# Patient Record
Sex: FEMALE | Race: WHITE | Employment: OTHER | URBAN - METROPOLITAN AREA
[De-identification: names, ages, dates, MRNs, and addresses within clinical notes are randomized per-mention and may not be internally consistent; named-entity substitution may affect disease eponyms.]

---

## 2020-07-24 ENCOUNTER — OFFICE VISIT - HEALTHEAST (OUTPATIENT)
Dept: PEDIATRICS | Facility: CLINIC | Age: 75
End: 2020-07-24

## 2020-07-24 DIAGNOSIS — M81.0 AGE RELATED OSTEOPOROSIS, UNSPECIFIED PATHOLOGICAL FRACTURE PRESENCE: ICD-10-CM

## 2020-07-24 DIAGNOSIS — R53.82 CHRONIC FATIGUE: ICD-10-CM

## 2020-07-24 DIAGNOSIS — L29.9 PRURITIC DISORDER: ICD-10-CM

## 2020-07-24 DIAGNOSIS — N18.2 CHRONIC KIDNEY DISEASE, STAGE 2 (MILD): ICD-10-CM

## 2020-07-24 DIAGNOSIS — Z86.19 HISTORY OF LYME DISEASE: ICD-10-CM

## 2020-07-24 DIAGNOSIS — M25.50 MULTIPLE JOINT PAIN: ICD-10-CM

## 2020-07-24 DIAGNOSIS — M89.9 DISORDER OF BONE, UNSPECIFIED: ICD-10-CM

## 2020-07-24 DIAGNOSIS — R41.3 MEMORY LOSS: ICD-10-CM

## 2020-07-24 LAB
ALBUMIN SERPL-MCNC: 4.3 G/DL (ref 3.5–5)
ALP SERPL-CCNC: 80 U/L (ref 45–120)
ALT SERPL W P-5'-P-CCNC: <9 U/L (ref 0–45)
ANION GAP SERPL CALCULATED.3IONS-SCNC: 11 MMOL/L (ref 5–18)
AST SERPL W P-5'-P-CCNC: 23 U/L (ref 0–40)
BASOPHILS # BLD AUTO: 0 THOU/UL (ref 0–0.2)
BASOPHILS NFR BLD AUTO: 1 % (ref 0–2)
BILIRUB SERPL-MCNC: 0.8 MG/DL (ref 0–1)
BUN SERPL-MCNC: 16 MG/DL (ref 8–28)
C REACTIVE PROTEIN LHE: <0.1 MG/DL (ref 0–0.8)
CALCIUM SERPL-MCNC: 9.9 MG/DL (ref 8.5–10.5)
CHLORIDE BLD-SCNC: 102 MMOL/L (ref 98–107)
CO2 SERPL-SCNC: 27 MMOL/L (ref 22–31)
CREAT SERPL-MCNC: 0.82 MG/DL (ref 0.6–1.1)
EOSINOPHIL # BLD AUTO: 0.1 THOU/UL (ref 0–0.4)
EOSINOPHIL NFR BLD AUTO: 1 % (ref 0–6)
ERYTHROCYTE [DISTWIDTH] IN BLOOD BY AUTOMATED COUNT: 10.8 % (ref 11–14.5)
ERYTHROCYTE [SEDIMENTATION RATE] IN BLOOD BY WESTERGREN METHOD: 6 MM/HR (ref 0–20)
GFR SERPL CREATININE-BSD FRML MDRD: >60 ML/MIN/1.73M2
GLUCOSE BLD-MCNC: 83 MG/DL (ref 70–125)
HCT VFR BLD AUTO: 42 % (ref 35–47)
HGB BLD-MCNC: 14.4 G/DL (ref 12–16)
LYMPHOCYTES # BLD AUTO: 1.4 THOU/UL (ref 0.8–4.4)
LYMPHOCYTES NFR BLD AUTO: 31 % (ref 20–40)
MCH RBC QN AUTO: 31.7 PG (ref 27–34)
MCHC RBC AUTO-ENTMCNC: 34.2 G/DL (ref 32–36)
MCV RBC AUTO: 93 FL (ref 80–100)
MONOCYTES # BLD AUTO: 0.4 THOU/UL (ref 0–0.9)
MONOCYTES NFR BLD AUTO: 9 % (ref 2–10)
NEUTROPHILS # BLD AUTO: 2.7 THOU/UL (ref 2–7.7)
NEUTROPHILS NFR BLD AUTO: 59 % (ref 50–70)
PLATELET # BLD AUTO: 168 THOU/UL (ref 140–440)
PMV BLD AUTO: 9.9 FL (ref 7–10)
POTASSIUM BLD-SCNC: 4.6 MMOL/L (ref 3.5–5)
PROT SERPL-MCNC: 6.6 G/DL (ref 6–8)
RBC # BLD AUTO: 4.52 MILL/UL (ref 3.8–5.4)
RHEUMATOID FACT SERPL-ACNC: <15 IU/ML (ref 0–30)
SODIUM SERPL-SCNC: 140 MMOL/L (ref 136–145)
TSH SERPL DL<=0.005 MIU/L-ACNC: 0.77 UIU/ML (ref 0.3–5)
WBC: 4.5 THOU/UL (ref 4–11)

## 2020-07-24 ASSESSMENT — MIFFLIN-ST. JEOR: SCORE: 989.6

## 2020-07-27 ENCOUNTER — COMMUNICATION - HEALTHEAST (OUTPATIENT)
Dept: INTERNAL MEDICINE | Facility: CLINIC | Age: 75
End: 2020-07-27

## 2020-07-27 LAB — 25(OH)D3 SERPL-MCNC: 48.9 NG/ML (ref 30–80)

## 2020-07-28 ENCOUNTER — COMMUNICATION - HEALTHEAST (OUTPATIENT)
Dept: PEDIATRICS | Facility: CLINIC | Age: 75
End: 2020-07-28

## 2020-07-28 LAB — CCP AB SER IA-ACNC: 2 U/ML

## 2020-07-29 ENCOUNTER — HOSPITAL ENCOUNTER (OUTPATIENT)
Dept: RADIOLOGY | Facility: HOSPITAL | Age: 75
Discharge: HOME | End: 2020-07-29
Attending: PEDIATRICS

## 2020-07-30 ENCOUNTER — COMMUNICATION - HEALTHEAST (OUTPATIENT)
Dept: INTERNAL MEDICINE | Facility: CLINIC | Age: 75
End: 2020-07-30

## 2020-07-30 DIAGNOSIS — R91.1 LUNG NODULE: ICD-10-CM

## 2020-07-30 DIAGNOSIS — R91.8 PULMONARY NODULES: ICD-10-CM

## 2020-08-03 ENCOUNTER — COMMUNICATION - HEALTHEAST (OUTPATIENT)
Dept: INTERNAL MEDICINE | Facility: CLINIC | Age: 75
End: 2020-08-03

## 2020-08-04 ENCOUNTER — AMBULATORY - HEALTHEAST (OUTPATIENT)
Dept: SCHEDULING | Facility: CLINIC | Age: 75
End: 2020-08-04

## 2020-08-04 DIAGNOSIS — R91.8 PULMONARY NODULES: ICD-10-CM

## 2020-08-06 ENCOUNTER — RECORDS - HEALTHEAST (OUTPATIENT)
Dept: ADMINISTRATIVE | Facility: OTHER | Age: 75
End: 2020-08-06

## 2020-08-10 ENCOUNTER — COMMUNICATION - HEALTHEAST (OUTPATIENT)
Dept: INTERNAL MEDICINE | Facility: CLINIC | Age: 75
End: 2020-08-10

## 2020-08-25 ENCOUNTER — OFFICE VISIT - HEALTHEAST (OUTPATIENT)
Dept: INTERNAL MEDICINE | Facility: CLINIC | Age: 75
End: 2020-08-25

## 2020-08-25 DIAGNOSIS — L29.9 PRURITIC DISORDER: ICD-10-CM

## 2020-08-25 DIAGNOSIS — J32.9 CHRONIC SINUSITIS, UNSPECIFIED LOCATION: ICD-10-CM

## 2020-08-25 DIAGNOSIS — R35.0 URINARY FREQUENCY: ICD-10-CM

## 2020-08-25 DIAGNOSIS — R19.7 DIARRHEA, UNSPECIFIED TYPE: ICD-10-CM

## 2020-08-27 ENCOUNTER — HOSPITAL ENCOUNTER (OUTPATIENT)
Dept: ULTRASOUND IMAGING | Facility: HOSPITAL | Age: 75
Discharge: HOME | End: 2020-08-27
Attending: PEDIATRICS

## 2020-08-27 ENCOUNTER — RECORDS - HEALTHEAST (OUTPATIENT)
Dept: ADMINISTRATIVE | Facility: OTHER | Age: 75
End: 2020-08-27

## 2020-08-27 DIAGNOSIS — R35.0 URINARY FREQUENCY: ICD-10-CM

## 2020-08-28 ENCOUNTER — COMMUNICATION - HEALTHEAST (OUTPATIENT)
Dept: PEDIATRICS | Facility: CLINIC | Age: 75
End: 2020-08-28

## 2020-09-08 ENCOUNTER — TRANSFERRED RECORDS (OUTPATIENT)
Dept: HEALTH INFORMATION MANAGEMENT | Facility: CLINIC | Age: 75
End: 2020-09-08

## 2020-09-30 ENCOUNTER — OFFICE VISIT - HEALTHEAST (OUTPATIENT)
Dept: OTOLARYNGOLOGY | Facility: CLINIC | Age: 75
End: 2020-09-30

## 2020-09-30 DIAGNOSIS — R09.89 THROAT TIGHTNESS: ICD-10-CM

## 2020-11-19 ENCOUNTER — RECORDS - HEALTHEAST (OUTPATIENT)
Dept: ADMINISTRATIVE | Facility: OTHER | Age: 75
End: 2020-11-19

## 2020-12-01 ENCOUNTER — OFFICE VISIT - HEALTHEAST (OUTPATIENT)
Dept: INTERNAL MEDICINE | Facility: CLINIC | Age: 75
End: 2020-12-01

## 2020-12-01 DIAGNOSIS — R19.7 DIARRHEA, UNSPECIFIED TYPE: ICD-10-CM

## 2020-12-01 DIAGNOSIS — M25.50 POLYARTHRALGIA: ICD-10-CM

## 2020-12-01 DIAGNOSIS — M81.0 AGE-RELATED OSTEOPOROSIS WITHOUT CURRENT PATHOLOGICAL FRACTURE: ICD-10-CM

## 2020-12-01 DIAGNOSIS — L29.9 PRURITIC DISORDER: ICD-10-CM

## 2021-03-08 ENCOUNTER — AMBULATORY - HEALTHEAST (OUTPATIENT)
Dept: NURSING | Facility: CLINIC | Age: 76
End: 2021-03-08

## 2021-03-29 ENCOUNTER — AMBULATORY - HEALTHEAST (OUTPATIENT)
Dept: NURSING | Facility: CLINIC | Age: 76
End: 2021-03-29

## 2021-04-16 ENCOUNTER — COMMUNICATION - HEALTHEAST (OUTPATIENT)
Dept: ADMINISTRATIVE | Facility: CLINIC | Age: 76
End: 2021-04-16

## 2021-04-16 DIAGNOSIS — L29.9 PRURITIC DISORDER: ICD-10-CM

## 2021-04-16 DIAGNOSIS — R19.7 DIARRHEA, UNSPECIFIED TYPE: ICD-10-CM

## 2021-04-29 ENCOUNTER — COMMUNICATION - HEALTHEAST (OUTPATIENT)
Dept: INTERNAL MEDICINE | Facility: CLINIC | Age: 76
End: 2021-04-29

## 2021-04-29 DIAGNOSIS — R19.7 DIARRHEA, UNSPECIFIED TYPE: ICD-10-CM

## 2021-04-29 DIAGNOSIS — L29.9 PRURITIC DISORDER: ICD-10-CM

## 2021-04-29 DIAGNOSIS — K64.8 HEMORRHOIDS, INTERNAL: ICD-10-CM

## 2021-05-09 ENCOUNTER — HEALTH MAINTENANCE LETTER (OUTPATIENT)
Age: 76
End: 2021-05-09

## 2021-05-19 ENCOUNTER — OFFICE VISIT - HEALTHEAST (OUTPATIENT)
Dept: FAMILY MEDICINE | Facility: CLINIC | Age: 76
End: 2021-05-19

## 2021-05-19 DIAGNOSIS — Z83.49 FAMILY HISTORY OF HEMOCHROMATOSIS: ICD-10-CM

## 2021-05-20 ENCOUNTER — AMBULATORY - HEALTHEAST (OUTPATIENT)
Dept: LAB | Facility: CLINIC | Age: 76
End: 2021-05-20

## 2021-05-20 DIAGNOSIS — Z83.49 FAMILY HISTORY OF HEMOCHROMATOSIS: ICD-10-CM

## 2021-05-20 LAB
FERRITIN SERPL-MCNC: 147 NG/ML (ref 10–130)
IRON SATN MFR SERPL: 42 % (ref 20–50)
IRON SERPL-MCNC: 109 UG/DL (ref 42–175)
TIBC SERPL-MCNC: 257 UG/DL (ref 313–563)
TRANSFERRIN SERPL-MCNC: 206 MG/DL (ref 212–360)
TRANSFERRIN SERPL-MCNC: 206 MG/DL (ref 212–360)

## 2021-05-22 ENCOUNTER — AMBULATORY - HEALTHEAST (OUTPATIENT)
Dept: FAMILY MEDICINE | Facility: CLINIC | Age: 76
End: 2021-05-22

## 2021-05-22 DIAGNOSIS — R79.89 HIGH SERUM TRANSFERRIN SATURATION: ICD-10-CM

## 2021-05-22 DIAGNOSIS — E83.10 DISORDER OF IRON METABOLISM, UNSPECIFIED: ICD-10-CM

## 2021-05-22 DIAGNOSIS — R79.89 ELEVATED FERRITIN LEVEL: ICD-10-CM

## 2021-05-28 ENCOUNTER — AMBULATORY - HEALTHEAST (OUTPATIENT)
Dept: LAB | Facility: CLINIC | Age: 76
End: 2021-05-28

## 2021-05-28 DIAGNOSIS — E83.10 DISORDER OF IRON METABOLISM, UNSPECIFIED: ICD-10-CM

## 2021-05-28 DIAGNOSIS — R79.89 HIGH SERUM TRANSFERRIN SATURATION: ICD-10-CM

## 2021-05-28 DIAGNOSIS — R79.89 ELEVATED FERRITIN LEVEL: ICD-10-CM

## 2021-05-28 LAB
ALBUMIN SERPL-MCNC: 3.9 G/DL (ref 3.5–5)
ALP SERPL-CCNC: 87 U/L (ref 45–120)
ALT SERPL W P-5'-P-CCNC: <9 U/L (ref 0–45)
AST SERPL W P-5'-P-CCNC: 21 U/L (ref 0–40)
BILIRUB DIRECT SERPL-MCNC: 0.2 MG/DL
BILIRUB SERPL-MCNC: 0.6 MG/DL (ref 0–1)
PROT SERPL-MCNC: 6.2 G/DL (ref 6–8)

## 2021-06-03 ENCOUNTER — OFFICE VISIT - HEALTHEAST (OUTPATIENT)
Dept: FAMILY MEDICINE | Facility: CLINIC | Age: 76
End: 2021-06-03

## 2021-06-03 DIAGNOSIS — Z83.49 FAMILY HISTORY OF HEMOCHROMATOSIS: ICD-10-CM

## 2021-06-03 DIAGNOSIS — K59.9 BOWEL DYSFUNCTION: ICD-10-CM

## 2021-06-03 LAB — MOLECULAR DX INHERIT DISORDER - HISTORICAL: NORMAL

## 2021-06-04 VITALS
HEIGHT: 63 IN | OXYGEN SATURATION: 96 % | HEART RATE: 69 BPM | SYSTOLIC BLOOD PRESSURE: 142 MMHG | BODY MASS INDEX: 21.23 KG/M2 | WEIGHT: 119.8 LBS | DIASTOLIC BLOOD PRESSURE: 76 MMHG

## 2021-06-07 ENCOUNTER — RECORDS - HEALTHEAST (OUTPATIENT)
Dept: ADMINISTRATIVE | Facility: OTHER | Age: 76
End: 2021-06-07

## 2021-06-09 NOTE — PROGRESS NOTES
"[facility]  Internal Medicine - Office Visit    Patient: Carina Hernandez   MRN: [id number]   Date of Service: 07/24/20   Patient Care Team:  Provider, No Primary Care as PCP - General    ASSESSMENT/PLAN     Carina Hernandez is a 75 y/o here to establish care:    Diagnoses and all orders for this visit:    Chronic fatigue  -     HM1(CBC and Differential)  -     Comprehensive Metabolic Panel  -     Thyroid Cascade  -     HM1 (CBC with Diff)  -     Vitamin D, Total (25-Hydroxy)    Pruritic disorder  No primary skin lesion. Will check labs and CXR given associated paraneoplastic syndrome with lymphoma.  -     HM1(CBC and Differential)  -     Comprehensive Metabolic Panel  -     Thyroid Cascade  -     XR Chest 2 Views  -     HM1 (CBC with Diff)    Multiple joint pain  She attributes to hx of Lyme. No effusions or morning stiffness. Will get inflammatory markers and antibodies to rule out inflammatory component.  -     Erythrocyte Sedimentation Rate  -     C-Reactive Protein (CRP)  -     Rheumatoid Factor Quant  -     CCP Antibodies    Disorder of bone, unspecified   She does take calcium and vitamin D.   -     Vitamin D, Total (25-Hydroxy)    Age related osteoporosis, unspecified pathological fracture presence   Declines treatment.    Chronic kidney disease, stage 2 (mild)  Hx of mild CKD, but most recent GFR appears normal. Was apparently seen by [facility] who felt it was age related.    History of Lyme disease    Memory Lapses Or Loss    Return to clinic in 3 months for follow up.    Alonzo Barbosa MD  Internal Medicine and Pediatrics  [facility]  Pager [phone]    SUBJECTIVE     Carina Hernandez is a 75 y/o here to establish care.    She recently moved to the area.    She reports a history of CKD dating to 2016. Saw nephrology at [facility] and was told she had \"distended bladder and kidneys\" though chart review by provider states she had normal renal ultrasound and UA. " "    She has osteoporosis, has declined treatment in the past.     She has been dealing with memory issues since 2010. She attribuets this to Lyme disease which left her with chronic memory loss, fatigue, and arthritis issues. She has pain in multiple joints such as her right thumb, hip, knees, and toes, but no swelling or morning stiffness.     She feels tired, particularly after eating.    She has had a persistent pruritis. Has changed soaps, detergents, eliminated wheat. It has been ongoing since 2010.    She often gets bloated, has been going on for over a year now.    Blood pressure is normally fine.     Review of Systems  Pertinent items are noted in HPI    Family History  Pertinent items are noted in HPI     Social History  Pertinent items are noted in HPI    Immunizations  Reviewed.    Past Medical/Surgical History  Reviewed and updated as appropriate    Medications    Current Outpatient Medications:      ASTAXANTHIN ORAL, Take by mouth., Disp: , Rfl:      MAGNESIUM CARBONATE MISC, Use As Directed., Disp: , Rfl:      UNABLE TO FIND, Med Name: Eye Protection  Basics, Disp: , Rfl:      UNABLE TO FIND, Med Name: Yevgeniy, Disp: , Rfl:     Allergies  Allergies   Allergen Reactions     Lactase      PN: Congestion and nasal drip     Wheat Myalgia     PN: Fatigue     Latex Rash            OBJECTIVE       /76 (Patient Site: Right Arm, Patient Position: Sitting, Cuff Size: Adult Regular)   Pulse 69   Ht 5' 2.5\" (1.588 m)   Wt 119 lb 12.8 oz (54.3 kg)   SpO2 96%   BMI 21.56 kg/m      General Appearance:   Alert, cooperative, no distress, appears stated age   HEENT:  Normocephalic, without obvious abnormality   Lungs:     Clear to auscultation bilaterally, respirations unlabored    Heart:    Regular rate and rhythm, S1 and S2 normal, no murmur, rub    or gallop   Abdomen:     Soft, non-tender, bowel sounds active all four quadrants,     no masses, no organomegaly   Extremities:   Extremities normal, atraumatic, " no cyanosis or edema; no effusions noted   Skin:   Skin color, texture, turgor normal, no rashes or lesions   Neurologic:   CNII-XII grossly intact, normal strength grossly       Labs/imaging/studies:  See above

## 2021-06-10 ENCOUNTER — TELEPHONE (OUTPATIENT)
Dept: CONSULT | Facility: CLINIC | Age: 76
End: 2021-06-10

## 2021-06-10 NOTE — TELEPHONE ENCOUNTER
Received radiologist reading for the kidney/bladder ultrasound 8/17/20.  Placed on provider's desk for review.  Brigitte was not able to locate the scan from 2017 kidney ultrasound.

## 2021-06-10 NOTE — TELEPHONE ENCOUNTER
Referral received from Dr. Ismael Hernandez @ [facility] for patient to be seen in Genetics for Family History of hemochromatosis. Spoke with patient, who states that she is independently having genetic testing done (through [facility]) and she was hoping to have those results available to bring in for her Genetics visit, but not be a part of her permanent records. 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 Informed patient that I will discuss with Genetics team to determine how to proceed with scheduling and will call her back regarding plan. Patient is agreeable to this.

## 2021-06-10 NOTE — PROGRESS NOTES
"Carina Hernandez is a 74 y.o. female who is being evaluated via a billable telephone visit.      The patient has been notified of following:     \"This telephone visit will be conducted via a call between you and your physician/provider. We have found that certain health care needs can be provided without the need for a physical exam.  This service lets us provide the care you need with a short phone conversation.  If a prescription is necessary we can send it directly to your pharmacy.  If lab work is needed we can place an order for that and you can then stop by our lab to have the test done at a later time.    Telephone visits are billed at different rates depending on your insurance coverage. During this emergency period, for some insurers they may be billed the same as an in-person visit.  Please reach out to your insurance provider with any questions.    If during the course of the call the physician/provider feels a telephone visit is not appropriate, you will not be charged for this service.\"    Patient has given verbal consent to a Telephone visit? Yes    What phone number would you like to be contacted at? [phone]    Patient would like to receive their AVS by AVS Preference: Mail a copy.    Additional provider notes:     Back in 2005, she was on 9 months of fosamax. Did a lot of research and spoke to nurses. Stopped taking it. Recommended she get Forteo shots, which were very expensive and she did not pursue. In 2011 was encouraged to take estrogen hormone; tried for a while and short time later sister developed breast cancer on hormonal replacement, so she stopped taking it. Would like to talk about it more in the future.     Throat feels clogged up. Uses a gisel pot with salt water. Doesn't seem to work. Wondering about her sinuses and if they could be inflamed. Years ago she did try something.     She has had issues with hemorrhoids. Underwent hemorrhoid banding x 2 in the past.     She gets anal " "mucous and leakage. Bloating comes and goes. History of constipation in the past and when she gets constipated, gets bloated. Started taking magnesium and it helps with constipation. Last colonoscopy \"3-4 years ago\" and was fine per her report. Lactose triggers mucous in stools.      Swollen ankles. Worse this last winter, but it is chronic.     Bladder not emptying completely. Has urinary frequency, sometimes 20 minutes right after.  She has been told before that she had a distended bladder.    Persistent itching. All over scalp, ears, breast, nipples. Dermatologic growth on scalp, back, and abdomen. Hard little pimples. Has been to a dermatologist in the past and that didn't \"get me anywhere.\"       Assessment/Plan:  1. Chronic sinusitis, unspecified location  She would like to see an ear nose and throat doctor to discuss her ongoing concerns with sinuses.  Will refer to ENT.  - Ambulatory referral to ENT    2. Pruritic disorder  Due to her diarrhea and abdominal symptoms, along with intense pruritus without any significant skin lesion per my last exam, will refer on to gastroenterology to see if she may have something like a carcinoid tumor which could be causing her symptoms.  - Ambulatory referral to Gastroenterology    3. Diarrhea, unspecified type  - Ambulatory referral to Gastroenterology    4. Urinary frequency  She has a history of a distended bladder.  She is having some urinary frequency.  We will repeat an ultrasound her kidneys and bladder to see if she has any evidence of urinary retention which could be causing the urinary losses.  - US Kidney Bilateral; Future    Phone call duration:  20 minutes    Alonzo Barbosa MD  "

## 2021-06-10 NOTE — TELEPHONE ENCOUNTER
----- Message from Alonzo Barbosa MD sent at 7/25/2020 10:36 PM CDT -----  Forgot to have patient fill out ZEE for records from [place] while in clinic. Can you look into doing this now that patient has left clinic? Thanks- Dr. Morgan

## 2021-06-10 NOTE — TELEPHONE ENCOUNTER
"----- Message from Alonzo Barbosa MD sent at 7/28/2020  2:09 PM CDT -----  Please call the patient with the following message, and offer to send a letter with my message and their results printed if they would like. If unable to reach, please send a letter with the following message along with their lab results from their most recent encounter with me. Thank you- Dr. Morgan    \"Gerardo Lim,    It was nice meeting you in clinic the other day. Your lab results are back.     Your inflammatory markers are all normal. Your rheumatoid arthritis antibodies were all negative. Your blood counts, electrolytes, liver, and kidney tests are also normal. Your vitamin D level is normal.    I will contact you once I get your x-ray results of your chest.    So far, no lab abnormalities to explain your fatigue, joint pains, and itching. Please follow up with me in 1 month to discuss further workup as needed.     Please let me know if you any questions or concerns,  Dr. Morgan  \"        "

## 2021-06-10 NOTE — TELEPHONE ENCOUNTER
Pt was informed of the providers response and was given the scheduling number for imaging.     Thank you   Maureen GRIMALDO CMA

## 2021-06-10 NOTE — TELEPHONE ENCOUNTER
"Maureen,    Can you call [facility] and ask if this bladder/kidney ultrasound done in August 2020 has been read by a radiologist? There was a scan in by the tech and it states \"FINAL\" but I do not see an official report by the radiologist. There is also a scan from a 2017 kidney ultrasound but no report from the one that was just done this August.    Please update me with what they say,  Dr. Morgan  "

## 2021-06-10 NOTE — TELEPHONE ENCOUNTER
Letter with pt's lab results and providers note were mailed to the pts home.    Thank you   Maureen PÉREZ CMA

## 2021-06-10 NOTE — PROGRESS NOTES
To: ROSETTA Barbosa Care Team   07/25/20    Forgot to have patient fill out EZE for records from [place] while in clinic. Can you look into doing this now that patient has left clinic? Thanks- Dr. Morgan

## 2021-06-10 NOTE — TELEPHONE ENCOUNTER
"----- Message from Alonzo Barbosa MD sent at 7/29/2020  2:36 PM CDT -----  Please call the patient with the following message, and offer to send a letter with my message and their results printed if they would like. If unable to reach, please send a letter with the following message along with their lab results from their most recent encounter with me. Thank you- Dr. Morgan    \"Jordan Lim,    Hope you are doing well. Your chest x-ray shows a very small 8 mm nodule in your left middle lung. The lungs otherwise look normal and there are no other lymph nodes throughout your chest (which is what I was looking for due to the pruritis you have had). Nodules are very common and most are not cancer, however the chest x-ray is not great at evaluating nodules. Because a CT is better at visualizing the nodule, it is recommended that we get a CT of your chest to further evaluate this nodule. Please let me know if you are willing to do this and I can order a CT of your chest.     Please let me know if you any questions or concerns,  Dr. Morgan\"        "

## 2021-06-10 NOTE — TELEPHONE ENCOUNTER
Mailing an ZEE to the pt's home to have the pt to complete then return to the clinic.      Thank you   Maureen GRIMALDO CMA

## 2021-06-10 NOTE — TELEPHONE ENCOUNTER
[facility]: [facility]   08/04/20    Can you follow up with patient and give her scheduling # for radiology so she can call to schedule herself?    Thanks,  Dr. Morgan

## 2021-06-10 NOTE — TELEPHONE ENCOUNTER
Test Results  Who is calling?:  Patient  Who ordered the test:  Alonzo Barbosa MD   Type of test: Imaging  Date of test:  7/29/20  Where was the test performed:  [facility]   What are your questions/concerns?:  Patient asked if it's possible that the shadow that was noted on her chest xray could be the biopsy marker she had placed on her left breast at [facility] in [place]. Patient stated she had the marker placed in 07/2018 and has not had it removed yet. Patient stated she was seeing Dr. Molina at [facility] for this. Patient stated Alonzo Barbosa MD should be able to pull this up on her computer because Alonzo Barbosa MD was looking at her [facility] records when the patient was in the office.    Patient stated per her notes, the biopsy marker is at 11:00, 6 cm from the left nipple.   Okay to leave a detailed message?:  Yes

## 2021-06-10 NOTE — TELEPHONE ENCOUNTER
Reviewed US. Kidneys normal. Bladder normal. No post void residual.    Maureen,  Can you let patient know that the ultrasound of kidney and bladder were normal?    Scanned copy left on Maureen Malave's desk. Please send patient a copy of it as well and scan into chart.    Thank,  Dr. sawyer

## 2021-06-10 NOTE — TELEPHONE ENCOUNTER
Please let patient know I called radiologist. The nodule he is seeing doesn't seem to be where her breast biopsy marker was placed.     Let me know if she wishes to get CT to further characterize the nodule they are seeing.    Dr. Morgan

## 2021-06-10 NOTE — TELEPHONE ENCOUNTER
Called [facility] radiology and was advised to call [facility].  Called [facility] and was informed the reports are kept at [facility].  Called [facility] radiology medical records informed Brigitte of provider's recommendations.   The Brigitte is following up on the radiologist readings and will fax the report to [phone].  Radiology medical records staff verbalized understanding and is agreeable with the plan of care.

## 2021-06-11 NOTE — TELEPHONE ENCOUNTER
Left message to call back for: Patient  Information to relay to patient:  LM with Ultra sound results and that a copy will be mailed to the house.     Thank you   Maureen

## 2021-06-11 NOTE — PROGRESS NOTES
"HISTORY OF PRESENT ILLNESS  Asked to see by Dr. Barbosa for evaluation of throat concerns. Patient reports that her primary problems is a fullness in the throat. She frequently is trying to clear. She has difficulty swallowing saliva but no problems swallowing food. She denies acid reflux symptoms. She feels that she is trying to get something up. She uses saline nasal irrigations. She gargles with salt water as well. The problem is intermittent but is apparent everyday. It doesn't appear to be related to the season. Going on for years. Others have encouraged to see someone about her throat. Dairy seems to make it worse. \"The bigger issue fore me is an issue with my tongue. I'm not aware what my tongue is doing.\" She sleeps on her stomach because of hip pain. She wakes up with her tongue is stuck. She also feels \"sinus drainage.\" Breathrights seem to help with these.     REVIEW OF SYSTEMS  Review of Systems: a 10-system review was performed. Pertinent positives are noted in the HPI and on a separate scanned document in the chart.    PMH, PSH, FH and SH has documented in the EHR.      EXAM    CONSTITUTIONAL  General Appearance:  Normal, well developed, well nourished, no obvious distress  Ability to Communicate:  communicates appropriately.    HEAD AND FACE  Appearance and Symmetry:  Normal, no scalp or facial scarring or suspicious lesions.  Paranasal sinuses tenderness:  Normal, Paranasal sinuses non tender    EARS  Clinical speech reception threshold:  Normal, able to hear normal speech.  Auricle:  Normal, Auricles without scars, lesions, masses.  External auditory canal:  Normal, External auditory canal normal.  Tympanic membrane:  Normal, Tympanic membranes normal without swelling or erythema.  Tympanic membrane mobility:  Normal, Normal tympanic membrane mobility.    NOSE (speculum or scope)  Architecture:  Normal, Grossly normal external nasal architecture with no masses or lesions.  Mucosa:  Normal mucosa, " No polyps or masses.  Septum:  Normal, Septum non-obstructing.  Turbinates:  Normal, No turbinate abnormalities    NASOPHARYNX  Post nasal space normal. Mucosa normal as are the openings to the Eustachian tubes.    ORAL CAVITY AND OROPHARYNX  Lips:  Normal.  Dental and gingiva:  Normal, No obvious dental or gingival disease.  Mucosa:  Normal, Moist mucous membranes.  Tongue:  Normal, Tongue mobile with no mucosal abnormalities  Hard and soft palate:  Normal, Hard and soft palate without cleft or mucosal lesions.  Oral pharynx:  Normal, Posterior pharynx without lesions or remarkable asymmetry.  Saliva:  Normal, Clear saliva.  Masses:  Normal, No palpable masses or pathologically enlarged lymph nodes.    LARYNX AND HYPOPHARYNX (mirror or scope)  Voice Quality:  Normal voice quality.  Supra glottis:  Normal, No edema or erythema.  Epiglottis:  Normal, No epiglottic mass or mucosal lesions.  Pyriform sinuses:  Normal, Pyriform sinuses clear.  Vocal cords appearance:  Normal, Vocal cord motion symmetric.  Vocal cord motion:  Normal, Vocal cord motion symmetric  Endolarynx:  Normal, No Endolaryngeal mass or mucosal lesions.    NECK  Masses/lymph nodes:  Normal, No worrisome neck masses or lymph nodes.  Salivary glands:  Normal, Parotid and submandibular glands.  Trachea and larynx position:  Normal, Trachea and larynx midline.  Thyroid:  Normal, No thyroid abnormality.  Tenderness:  Normal, No cervical tenderness.  Suppleness:  Normal, Neck supple    NEUROLOGICAL  Speech pattern:  Normal, Proasaic    RESPIRATORY  Symmetry and Respiratory effort:  Normal, Symmetric chest movement and expansion with no increased intercostal retractions or use of accessory muscles.     IMPRESSION  1. Chronic habitual throat clearing. I discussing normal exam without evidence inflammation or swelling.  2. Throat tightness/fullness. She swallows solid foods without difficulty, yet her problems is swallowing saliva.     RECOMMENDATION  1. For  the throat clearing I advised cough drop or throat lozenges. Sipping water also helps. Anything to avoid throat clearing is preferential.   2. I explained that we could certainly evaluated her swallowing with barium swallow if swallowing symptoms become worse. Follow up as needed.     Max Breen MD

## 2021-06-13 NOTE — PROGRESS NOTES
"Carina Hernandez is a 75 y.o. female who is being evaluated via a billable telephone visit.      The patient has been notified of following:     \"This telephone visit will be conducted via a call between you and your physician/provider. We have found that certain health care needs can be provided without the need for a physical exam.  This service lets us provide the care you need with a short phone conversation.  If a prescription is necessary we can send it directly to your pharmacy.  If lab work is needed we can place an order for that and you can then stop by our lab to have the test done at a later time.    Telephone visits are billed at different rates depending on your insurance coverage. During this emergency period, for some insurers they may be billed the same as an in-person visit.  Please reach out to your insurance provider with any questions.    If during the course of the call the physician/provider feels a telephone visit is not appropriate, you will not be charged for this service.\"    Patient has given verbal consent to a Telephone visit? Yes    What phone number would you like to be contacted at? [phone]    Patient would like to receive their AVS by AVS Preference: Mail a copy.    Additional provider notes:     Carina is here for follow-up today.    Regarding her pruritus and diarrhea, at her last visit in August, I referred her on to gastroenterology to discuss her diarrhea and intense pruritus to see if there was evidence of pseudotumor. She did meet with [place] [facility] via a virtual visit. They recommended doing further colorectal testing, but she did not pursue further testing. She continues to have diarrhea, though it is not as frequent as before. She continues to have intense pruritus.    She is wondering whether or not she could see a dermatologist. In the past she has been seen by dermatology, who recommended steroids which she was not interested in.    She has swelling in " both of her ankles which started about last winter. She does not have any chest pain or shortness of breath. She has been getting a little bit of weight because of the pandemic. She does wear compression stockings. She has noticed increasing varicose veins in her legs.    She is having more joint pain all over her body. The other week she was having pain along her right ribs. It is better now that she stopped wearing her bra. She knows that she has osteoporosis and is wondering if that could be contributing to her pain.      Assessment/Plan:    1. Pruritic disorder  Work-up including labs as well as a CT chest looking for lymphoma has been negative. I have also referred her on to [place] [facility] to evaluate for carcinoid tumor given that she has diarrhea along with this intense pruritus. She did see [place] [facility] who recommended further work-up which she has not yet pursued. I encouraged her to follow-up and do this testing. She has been seen by dermatology in the past. She would like a second opinion by dermatologist which I agree with. I will refer her on to a dermatologist.  - Ambulatory referral to Dermatology    2. Age-related osteoporosis without current pathological fracture  She is not interested in treatment for osteoporosis. I did discuss with her that due to her osteoporosis, she is at increased risk for fractures. I would not expect her to have the significant joint pain just due to osteoporosis alone. We have done work-up for her polyarthralgia in the past including antibodies and inflammatory markers which were normal.    3. Diarrhea, unspecified type  See above    4. Polyarthralgia  See above    Phone call duration: 15 minutes    Alonzo Barbosa MD  Internal Medicine and Pediatrics  [facility]

## 2021-06-16 PROBLEM — M81.0 AGE-RELATED OSTEOPOROSIS WITHOUT CURRENT PATHOLOGICAL FRACTURE: Status: ACTIVE | Noted: 2018-02-22

## 2021-06-16 PROBLEM — L29.9 PRURITIC DISORDER: Status: ACTIVE | Noted: 2020-12-02

## 2021-06-16 PROBLEM — R19.7 DIARRHEA, UNSPECIFIED TYPE: Status: ACTIVE | Noted: 2020-12-02

## 2021-06-16 PROBLEM — M25.50 POLYARTHRALGIA: Status: ACTIVE | Noted: 2020-12-02

## 2021-06-16 PROBLEM — R91.1 LUNG NODULE: Status: ACTIVE | Noted: 2020-07-30

## 2021-06-16 PROBLEM — A69.20 LYME DISEASE: Status: ACTIVE | Noted: 2020-07-25

## 2021-06-16 PROBLEM — N18.2 CHRONIC KIDNEY DISEASE, STAGE 2 (MILD): Status: ACTIVE | Noted: 2018-02-22

## 2021-06-16 PROBLEM — Z86.19 HISTORY OF LYME DISEASE: Status: ACTIVE | Noted: 2018-02-22

## 2021-06-16 PROBLEM — E78.5 HYPERLIPIDEMIA: Status: ACTIVE | Noted: 2018-02-22

## 2021-06-16 PROBLEM — Z98.890 HISTORY OF COLONOSCOPY: Status: ACTIVE | Noted: 2018-02-22

## 2021-06-16 PROBLEM — K64.8 HEMORRHOIDS, INTERNAL: Status: ACTIVE | Noted: 2018-02-22

## 2021-06-16 NOTE — TELEPHONE ENCOUNTER
Spoke with patient and assisted in scheduling [facility] only visit.     Future Appointments   Date Time Provider Department Center   7/2/2021  9:00 AM Saritha Apodaca [facility]

## 2021-06-16 NOTE — TELEPHONE ENCOUNTER
Last visit: Virtual 12/1/2020-Dr Lorri Barbosa  1. Pruritic disorder  Work-up including labs as well as a CT chest looking for lymphoma has been negative. I have also referred her on to [facility] to evaluate for carcinoid tumor given that she has diarrhea along with this intense pruritus. She did see [facility] who recommended further work-up which she has not yet pursued. I encouraged her to follow-up and do this testing. She has been seen by dermatology in the past. She would like a second opinion by dermatologist which I agree with. I will refer her on to a dermatologist.  - Ambulatory referral to Dermatology     2. Age-related osteoporosis without current pathological fracture  She is not interested in treatment for osteoporosis. I did discuss with her that due to her osteoporosis, she is at increased risk for fractures. I would not expect her to have the significant joint pain just due to osteoporosis alone. We have done work-up for her polyarthralgia in the past including antibodies and inflammatory markers which were normal.     3. Diarrhea, unspecified type  See above     4. Polyarthralgia  See above      Patient requesting new referral for [facility]. Was told a new order is needed.

## 2021-06-16 NOTE — TELEPHONE ENCOUNTER
Reason for Call:  Other Order     Detailed comments: Patient calling this afternoon to request new order for [facility].  Order was placed on 8/2020 and facility is stating patient should get new order.  Patient did speak with facility at that time and wanted to wait until after she was fully vaccinated.  This has since been accomplished, so she would like to follow thru with appt at this time.    Phone Number Patient can be reached at: Home number on file [phone] (home)    Best Time: Any time    Can we leave a detailed message on this number?: Yes    Call taken on 4/16/2021 at 2:17 PM by Tristian Olivier

## 2021-06-17 NOTE — PROGRESS NOTES
Carina Hernandez is a 75 y.o. female who is being evaluated via a billable telephone visit.      What phone number would you like to be contacted at? [phone]  How would you like to obtain your AVS? AVS Preference: Gretel.    Assessment & Plan     Carina was seen today for requesting labs.    Diagnoses and all orders for this visit:    Family history of hemochromatosis  -     Transferrin; Future  -     Iron and Transferrin Iron Binding Capacity; Future  -     Ferritin; Future      Patient Instructions   Carina,  Thank you for speaking with me this morning.  I placed lab orders to check your ferritin, iron, transferrin, and iron binding capacity.    Please contact the lab at [phone] to schedule a lab only visit at your convenience.  I would expect to have results back to you within 2 days.  If the lab results indicate possible hereditary hemochromatosis, then I will place an order for the genetic test, which would be able to confirm the condition.  Please let me know if have any questions, otherwise I will be in touch once your results return.    Return for Follow up if you would like to see genetic counselor. .    Ismael Hernandez MD  [facility]   Carina Hernandez is 75 y.o. and presents today for the following health issues   HPI   Patient would like to have lab testing performed to screen for hereditary hemochromatosis.  Patient's son had recently been evaluated and noted to have an elevated ferritin level.  Further evaluation had suggested elevated liver function tests, prompting screening for hereditary hemochromatosis.  Testing was suggestive of this condition and he subsequently underwent an MRI of his liver which was noted to be reassuring.  Subsequently, patient's daughter had been screened for the condition and had also been noted to be positive for genetic mutation. Patient is concerned that she may have transmitted abnormal gene to her children.  She notes  "no history of having ferritin level checked.  LFTs were noted to be normal in 2018.       Review of Systems  Negative except as noted above in HPI.       Objective    Vitals - Patient Reported  Systolic (Patient Reported): 103  Diastolic (Patient Reported): 66  Blood pressure taken with manual cuff (will exclude from quality measure): Yes  Weight (Patient Reported): 120 lb (54.4 kg)  Height (Patient Reported): 5' 3\" (160 cm)  BMI (Based on Pt Reported Ht/Wt): 21.26    Physical Exam  Objective    General: alert/oriented to person/place. Answers questions appropriately.   Affect: pleasant, cooperative.           Phone call duration: 18 minutes    Ismael Hernandez MD   Family medicine physician  [facility]   "

## 2021-06-17 NOTE — TELEPHONE ENCOUNTER
Please apologize to patient that we did not notify her of results sooner.  Her kidney ultrasound of 8/27/20 was entirely normal.   Thank you,  Ismael Hernandez MD for Dr Morgan

## 2021-06-17 NOTE — TELEPHONE ENCOUNTER
Called and relayed message to patient and she stated that she is not satisfied with just being told that the ultrasound was normal. Patient very frustrated that she is being called now and just told normal results. States that she specifically asked the radiologist to compare this US to her one from 2017 and see if there is a difference or change since at the time in 2017 she learned that she had bladder and kidney distention.     Found the report that was scanned in from 2017. Printed both 2017 and 2020 results. Given to covering provider to look over and note if there is changes as patient would like a more in detail call back then just told it is normal.

## 2021-06-17 NOTE — PATIENT INSTRUCTIONS - HE
Carina,  Thank you for speaking with me this morning.  I placed lab orders to check your ferritin, iron, transferrin, and iron binding capacity.    Please contact the lab at [phone] to schedule a lab only visit at your convenience.  I would expect to have results back to you within 2 days.  If the lab results indicate possible hereditary hemochromatosis, then I will place an order for the genetic test, which would be able to confirm the condition.  Please let me know if have any questions, otherwise I will be in touch once your results return.

## 2021-06-17 NOTE — TELEPHONE ENCOUNTER
Called and left  for patient stating that a referral was placed and she can call [phone] to schedule an appt

## 2021-06-17 NOTE — TELEPHONE ENCOUNTER
See below.  Per patient [facility] is referring back [facility] after recent consult.  Please place orders if appropriate and we will process/send to facility.  Facility will call patient to schedule.

## 2021-06-17 NOTE — TELEPHONE ENCOUNTER
Dr. Morgan    Patient is wanting a call back regarding her referral to [facility].  She said that she got referred to [facility] and they referred to her to [facility] and she has never heard from them.  She would like a call back at .

## 2021-06-17 NOTE — TELEPHONE ENCOUNTER
Called and relayed message to patient and she was beyond thankful for Dr. Hernandez to take the time to compare these for her. She insisted I get the thank you to him.

## 2021-06-17 NOTE — TELEPHONE ENCOUNTER
Attempted to call patient. Rang a few times then went silent.    Will try to call again.    If patient calls back, please relay message below

## 2021-06-17 NOTE — TELEPHONE ENCOUNTER
Patient calling this morning in regards to message/missed call from below.  Patient states there was an US kidney/bladder done on 8/27/2020 that she never received any information on.  US was completed on 8/27/2020, no radiology report for the scan was ever generated and patient has received no word in regards to the results or details of this scan. This is her primary concern at this point as she has never heard anything about the US.    [phone]. Okay to leave message.    Patient took the information about [facility] referral.  She will follow up with them on her own.

## 2021-06-17 NOTE — TELEPHONE ENCOUNTER
Patient did not receive any word or results on ultrasound from 8/27/2020. She is concerned and wants to know the results. There is media scans from 8/27/2020 but I am unsure if this is the actual result report.

## 2021-06-17 NOTE — TELEPHONE ENCOUNTER
Please phone patient.  It looks like we had actually phoned the patient on 8/28/20 with results of ultrasound and also mailed a copy to her.      In any case, the ultrasound from 2017 had shown only mild distension of the kidney pelvis, and this had actually improved after voiding.  They had concluded that it was essentially normal.   They did comment that her bladder was mildly distended prior to voiding and that there was a small amount of urine remaining in the bladder after voiding.       The ultrasound from August had shown no distension in either kidney or the bladder.  There was no evidence of any urine remaining in the bladder after voiding.      I would consider the ultrasound result from August to be entirely reassuring. Please let me know if she has any additional questions.     Thanks,  Ismael Hernandez MD  For [facility]

## 2021-06-20 NOTE — LETTER
Letter by Alonzo Barbosa MD at      Author: Alonzo Barbosa MD Service: -- Author Type: --    Filed:  Encounter Date: 8/10/2020 Status: (Other)         Carina Hernandez  [address]  [place] [address]             August 10, 2020         Dear Ms. Hernandez,    Below are the results from your recent visit:    Resulted Orders   CT Chest Without Contrast    Narrative    EXAM DATE:         08/06/2020    EXAM: CT CHEST WITHOUT CONTRAST  LOCATION: [facility]  DATE/TIME: 8/6/2020 1:00 PM    INDICATION: Indeterminate 8 mm nodular opacity left midlung laterally at  07/29/2020 chest radiograph.  COMPARISON: 07/29/2020 chest radiograph.  TECHNIQUE: CT chest without IV contrast. Multiplanar reformats were obtained.  Dose reduction techniques were used.  CONTRAST: None.    FINDINGS:  LUNGS AND PLEURA: Several 3 mm or less nodular opacities in the right upper  lobe, right middle lobe and left upper lobe (MIP images 22-47) appear to be  small foci of endobronchial secretion. Benign punctate calcified granuloma left  lower lobe. Lungs are otherwise clear. No pleural effusion.    MEDIASTINUM/AXILLAE: No lymphadenopathy. Heart size within normal limits. No  pericardial effusion. No thoracic aortic aneurysm.    UPPER ABDOMEN: No significant finding.    MUSCULOSKELETAL: A benign bone island in the posterolateral aspect of the left  8th rib accounts for the nodular opacity at recent chest radiograph. L1  vertebral density measurement of less than 110HU. This finding indicates a high  likelihood of osteoporosis and, if clinically indicated, DEXA might be useful to  confirm this diagnosis and/or to monitor for treatment related changes.    IMPRESSION:  1.  A benign bone island in the posterolateral aspect of the left eighth rib  accounts for the nodular opacity at recent chest radiograph.  2.  Several 3 mm or less nodular opacities in the right upper lobe, right middle  lobe and left upper lobe appear  "to be small foci of endobronchial secretion. As  a conservative measure, recommend follow-up CT chest in one year to confirm  stability or resolution.  3.  High likelihood of osteoporosis.    NOTE: ABNORMAL REPORT    THE DICTATION ABOVE DESCRIBES AN ABNORMALITY FOR WHICH FOLLOW-UP IS NEEDED.    DICOM format image data is available to non-affiliated external healthcare  facilities or entities on a secure, media-free, reciprocally searchable basis  with patient authorization for at least a 12-month period after the study.                 \"Jordan Lim,     Your CT chest scan is back. As a reminder, this was done because you had what appeared to be a small left nodule around the the rib on the chest x-ray done on 7/29/2020. On the CT, this actually is a \"bone island\" which is a benign bone tumor. It is not concerning on imaging and we do not need to follow up on it unless you develop any bony pain along your left 8th rib.     The rest of your CT scan shows that you have several very small 3mm or less nodules in the right upper, right middle, and left upper lobe. On imaging it looks like some very minor inflammation or secretions in your bronchioles, which are very small airways in your lungs. They do not look concerning for cancer based on their size and appearance, however if you are agreeable we should repeat the CT in 1 year to confirm that they have either resolved or have not gotten bigger. If you develop any shortness of breath or chronic cough in the meantime, of course please follow up with me sooner.     Please let me know if you any questions or concerns,   Dr. Morgan\"     Please call with questions or contact us using [employer].    Sincerely,        Electronically signed by Alonzo Barbosa MD       "

## 2021-06-20 NOTE — LETTER
Letter by Alonzo Barbosa MD at      Author: Alonzo Barbosa MD Service: -- Author Type: --    Filed:  Encounter Date: 7/28/2020 Status: (Other)         Carina Hernandez  [address]  [facility] [address]             July 28, 2020         Dear Ms. David,    Below are the results from your recent visit:    Resulted Orders   Comprehensive Metabolic Panel   Result Value Ref Range    Sodium 140 136 - 145 mmol/L    Potassium 4.6 3.5 - 5.0 mmol/L    Chloride 102 98 - 107 mmol/L    CO2 27 22 - 31 mmol/L    Anion Gap, Calculation 11 5 - 18 mmol/L    Glucose 83 70 - 125 mg/dL    BUN 16 8 - 28 mg/dL    Creatinine 0.82 0.60 - 1.10 mg/dL    GFR MDRD Af Amer >60 >60 mL/min/1.73m2    GFR MDRD Non Af Amer >60 >60 mL/min/1.73m2    Bilirubin, Total 0.8 0.0 - 1.0 mg/dL    Calcium 9.9 8.5 - 10.5 mg/dL    Protein, Total 6.6 6.0 - 8.0 g/dL    Albumin 4.3 3.5 - 5.0 g/dL    Alkaline Phosphatase 80 45 - 120 U/L    AST 23 0 - 40 U/L    ALT <9 0 - 45 U/L    Narrative    Fasting Glucose reference range is 70-99 mg/dL per  American Diabetes Association (ADA) guidelines.   Thyroid Cascade   Result Value Ref Range    TSH 0.77 0.30 - 5.00 uIU/mL   Erythrocyte Sedimentation Rate   Result Value Ref Range    Sed Rate 6 0 - 20 mm/hr   C-Reactive Protein (CRP)   Result Value Ref Range    CRP <0.1 0.0 - 0.8 mg/dL   Rheumatoid Factor Quant   Result Value Ref Range    Rheumatoid Factor Quantitative <15.0 0 - 30 IU/mL   CCP Antibodies   Result Value Ref Range    CCP IgG Antibodies 2.0 <=4.9 U/mL   HM1 (CBC with Diff)   Result Value Ref Range    WBC 4.5 4.0 - 11.0 thou/uL    RBC 4.52 3.80 - 5.40 mill/uL    Hemoglobin 14.4 12.0 - 16.0 g/dL    Hematocrit 42.0 35.0 - 47.0 %    MCV 93 80 - 100 fL    MCH 31.7 27.0 - 34.0 pg    MCHC 34.2 32.0 - 36.0 g/dL    RDW 10.8 (L) 11.0 - 14.5 %    Platelets 168 140 - 440 thou/uL    MPV 9.9 7.0 - 10.0 fL    Neutrophils % 59 50 - 70 %    Lymphocytes % 31 20 - 40 %    Monocytes % 9 2 - 10 %    Eosinophils  "% 1 0 - 6 %    Basophils % 1 0 - 2 %    Neutrophils Absolute 2.7 2.0 - 7.7 thou/uL    Lymphocytes Absolute 1.4 0.8 - 4.4 thou/uL    Monocytes Absolute 0.4 0.0 - 0.9 thou/uL    Eosinophils Absolute 0.1 0.0 - 0.4 thou/uL    Basophils Absolute 0.0 0.0 - 0.2 thou/uL   Vitamin D, Total (25-Hydroxy)   Result Value Ref Range    Vitamin D, Total (25-Hydroxy) 48.9 30.0 - 80.0 ng/mL    Narrative    Deficiency <10.0 ng/mL  Insufficiency 10.0-29.9 ng/mL  Sufficiency 30.0-80.0 ng/mL  Toxicity (possible) >100.0 ng/mL     \"Hello Carina,   It was nice meeting you in clinic the other day. Your lab results are back.     Your inflammatory markers are all normal. Your rheumatoid arthritis antibodies were all negative. Your blood counts, electrolytes, liver, and kidney tests are also normal. Your vitamin D level is normal.     I will contact you once I get your x-ray results of your chest.     So far, no lab abnormalities to explain your fatigue, joint pains, and itching. Please follow up with me in 1 month to discuss further workup as needed.     Please let me know if you any questions or concerns,   Dr. Morgan            "

## 2021-06-25 ENCOUNTER — TELEPHONE (OUTPATIENT)
Dept: CONSULT | Facility: CLINIC | Age: 76
End: 2021-06-25

## 2021-06-25 NOTE — TELEPHONE ENCOUNTER
Carina called me to note she has been unable to complete the pre-visit check in as her [employer] [facility] [facility] MyChart does not seem to have merged with [facility]. I confirmed I would see her regardless as scheduled (telephone visit), but will see if our /clinic  can reach out to help with the MyChart access in the meantime. Carina had no other questions at this time.      Jessy Apodaca MS, [facility]  Genetic Counseling  Genetics and Metabolism Division  [facility]   Phone: [phone]  Pager: [phone]  Email: [email or web address]

## 2021-06-25 NOTE — PROGRESS NOTES
Carina Hernandez is a 75 y.o. female who is being evaluated via a billable telephone visit.      What phone number would you like to be contacted at? [phone]  How would you like to obtain your AVS? AVS Preference: Gretel.    Assessment & Plan     Carina was seen today for test results.    Diagnoses and all orders for this visit:    Family history of hemochromatosis  -     Ambulatory referral to Genetics    Bowel dysfunction   -Will await the notes from visit at [facility] in September to help clarify rationale for their referral of the [facility] to [facility].  I will then follow-up with the patient to try to help explain the reasoning.    Return for Follow up by phone next week once results of genetic testing for hereditary hemochromatosis return.    Ismael Hernandez MD  [facility]   Carina Hernandez is 75 y.o. and presents today for the following health issues   HPI     Testing for hereditary hemochromatosis-I had last spoken with the patient at a virtual visit on 5/19/2021.  At that time she had reported that her adult son and adult daughter had recently been diagnosed with hereditary hemochromatosis and had requested that she be screened for this condition, as well.  Initial testing had included ferritin, iron, TIBC and transferrin.  Based on these results, which had shown elevated ferritin level, borderline elevated iron level, mildly elevated transferrin saturation, I had recommended that patient pursue additional testing.  Patient had testing for genetic mutations for hereditary hemochromatosis on 5/28/21.  Typical turnaround time is listed at 7-10 days.  Results are currently pending.  Patient wonders if it would make sense for her to see a genetic counselor to discuss further the family history of hereditary hemochromatosis and potentially the need for any additional genetic screening.    Patient also notes that she had visited with  gastroenterology in September of last year regarding some bowel irregularities that she was experiencing including persistent anal leakage and mucus discharge with associated bloating.  She notes that the gastroenterologist had recommended that she follow-up with [facility].  She is unclear however what the rationale for this recommendation was, given her symptoms.  She has yet to schedule a visit with [facility].  The notes from that particular visit are unfortunately not available for review within the patient's EMR.      Review of Systems  Negative except as noted above in HPI.       Objective       Vitals:  No vitals were obtained today due to virtual visit.    Physical Exam  Objective    General: alert/oriented to person/place. Answers questions appropriately.   Affect: pleasant, cooperative.   Respiratory: no labored breathing noted.  No coughing during visit.    Component      Latest Ref Rng & Units 5/20/2021 5/20/2021           1:30 PM  1:30 PM   Iron      42 - 175 ug/dL  109   Transferrin      212 - 360 mg/dL 206 (L) 206 (L)   Transferrin Saturation, Calculated      20 - 50 %  42   Transferrin IBC, Calculated      313 - 563 ug/dL  257 (L)   Ferritin      10 - 130 ng/mL 147 (H)      Phone call duration: 23 minutes    Ismael Hernandez MD   Family medicine physician  [facility]

## 2021-06-26 NOTE — PATIENT INSTRUCTIONS - HE
Thanks for speaking with me today about your recent test results and the additional questions that you had regarding genetic testing.  The test that you had performed on the 28th of last month should be back for review next week at some point.  I will be in touch once they cross my desk.  I did also place a referral for you to visit with one of our genetic counselors regarding the significance of test results.  One of their schedulers should be contacting you to help schedule an appointment, but feel free to defer this if you wish until a later date.    I did send a request to [facility] for a copy of your consultation from September of this year.  Once I received that and review it, I can send back a message regarding the rationale for them referring you to [facility].

## 2021-06-29 ENCOUNTER — RECORDS - HEALTHEAST (OUTPATIENT)
Dept: INTERNAL MEDICINE | Facility: CLINIC | Age: 76
End: 2021-06-29

## 2021-06-29 DIAGNOSIS — H90.6 MIXED CONDUCTIVE AND SENSORINEURAL HEARING LOSS OF BOTH EARS: ICD-10-CM

## 2021-07-02 ENCOUNTER — VIRTUAL VISIT (OUTPATIENT)
Dept: CONSULT | Facility: CLINIC | Age: 76
End: 2021-07-02
Attending: GENETIC COUNSELOR, MS
Payer: MEDICARE

## 2021-07-02 DIAGNOSIS — Z83.49 FAMILY HISTORY OF HEMOCHROMATOSIS: ICD-10-CM

## 2021-07-02 DIAGNOSIS — Z14.8 CARRIER OF HEMOCHROMATOSIS HFE GENE MUTATION: Primary | ICD-10-CM

## 2021-07-02 DIAGNOSIS — Z15.89 MONOALLELIC MUTATION OF HFE GENE: ICD-10-CM

## 2021-07-02 DIAGNOSIS — Z71.83 ENCOUNTER FOR NONPROCREATIVE GENETIC COUNSELING: ICD-10-CM

## 2021-07-02 PROCEDURE — 96040 HC GENETIC COUNSELING, EACH 30 MINUTES: CPT | Mod: TEL | Performed by: GENETIC COUNSELOR, MS

## 2021-07-02 NOTE — Clinical Note
7/2/2021      RE: Carina Hernandez  [address]  [facility] [address]       Carina is a 75 year old who is being evaluated via a billable telephone visit.      What phone number would you like to be contacted at? [phone]  How would you like to obtain your AVS? Mail a copy       Elizabeth Blount M.A.        Saritha Apodaca GC

## 2021-07-02 NOTE — LETTER
7/2/2021      RE: Carina Hernandez  [address]  [facility] [address]       Carina is a 75 year old who is being evaluated via a billable telephone visit.      What phone number would you like to be contacted at? [phone]  How would you like to obtain your AVS? Mail a copy       Elizabeth Blount M.A.      Presenting information:   Carina Hernandez is a 75 year of female with a family history of hemochromatosis. She was referred for a genetics evaluation by Dr. Ismael Hernandez and was seen today virtually at the [facility]. I met with Carina by telephone to obtain a personal and family history, review her genetic testing results, and discuss the genetics and inheritance of hemochromatosis.     Personal History:  Carina had initial testing including ferritin, iron, TIBC and transferrin ordered due to her family history. Based on these results (elevated ferritin level, normal iron level, low transferrin) Dr. Hernandez had ordered additional genetic testing. See Dr. Hernandez' encounters for more details. 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 The H63D and S65C mutations were not detected.     Additional history includes mild chronic kidney disease diagnosed 3-4 years ago (etiology reportedly unknown), possible osteoporosis, and Lyme disease with symptoms.     Family History:   A pedigree was obtained today and sent to be scanned into the EMR. The family history was significant for the following:    Carina has two daughters and one son. Her son (~52 years) was the first to be diagnosed with hemochromatosis with reported positive genetic testing. He has a history of migraines, joint issues, depression, fatigue, and eye sight changes. This lead to elevated iron levels being found, and subsequently the diagnosis. He has started treatment with blood draws, chelation, and diet changes. He has three sons, who are reportedly being tested for  "hemochromatosis. One has low hemoglobin, for which testing is on-going. If a cause is found, this may give more information about if there is any significance for other family members. One of Carina's daughters (~50 years) was also diagnosed with hemochromatosis 2 months ago and reportedly found to have two copies of the C282Y mutation. She has a history of dizzy spells that affected her eye sight (possible testing was done, full details were unknown today). She also donated a kidney to her father. She has one daughter, who is healthy and reportedly going to be tested for hemochromatosis. Carina's other daughter (~50 years) has not had hemochromatosis testing yet. She has no health concerns. She has three biologic children, who are health other than 2 being born prematurity.    Carina has one brother, who is ~81 years. She is not aware of any health concerns for him. Carina also has one sister, who passed in 2016 (~65 years) from metastatic breast cancer. This was diagnosed when she was around 60 years. Carina notes she was \"on hormones\" that may have been a factor for this diagnosis. One of her children has developmental delays, which reportedly was possible due to some bacteria that \"caused damage\" for him.    Carina's mother passed in 2008. She had a history of abdominal pain, a hernia, HBP, anxiety, osteoporosis, and breast cancer diagnosed in her 80's-90's (Carina notes it may have been related to hormone takes prior for her bone symptoms). Recommend at minimum that the family let their providers know about this family history of cancer so they can get appropriate screening.    Carina's father passed in 1996 due to a stroke after getting a pacemaker. He had a history of an enlarged heart. Recommend that the family let their providers know about this family history so they can get appropriate cardiac screening.    One of Carina's paternal cousins has a handicap son, who has different facial features and " "finger differences. Full details are unknown, but Carina noted it was \"like Down syndrome.\" This sounds suspicious for a possible genetic condition. While it seems unlikely to change anything for Carina's direct management, additional information regarding this individual's diagnosis and any other family members that may have been tested would be beneficial to determine if there is increased chance for other family members personally have or have a child with related symptoms.    Full extended family history was not obtained today due to time, but negative for known individuals with hemochromatosis, known elevated iron, similar history, infertility, and other known genetic conditions.    Carina is of [place] ancestry on her mother's side and [place] ancestry on her paternal side. Consanguinity was denied.     Background:   Genes are long stretches of DNA that are responsible for how our bodies look and how our bodies work. We all have two copies of every gene, one inherited from our mother and one inherited from our father. When there is a change, called a mutation, in a gene it can cause it to not do its job correctly which can cause the signs and symptoms of a genetic condition. Carina studied biology in college and was aware of this information.     Hemochromatosis is a variable adult-onset condition that is caused by an over-absorption of iron. Without proper treatment and management, extra iron can be stored in other parts of the body including the heart, liver, pancreas, joints, and skin, which can lead to damage to these organs and tissues. Early signs of hemochromatosis include abdominal and joint pain, fatigue, lethargy, and weight loss. As the disease progresses, individuals can experience heart and liver disease, diabetes, and arthritis. Periodic therapeutic blood removal, or phlebotomy, is the most common means of iron reduction/treatment. Not every individual who carries mutations in " HFE gene will develop hemochromatosis, and even those who do in a family may have variability in exact symptoms and severity.     The most common gene associated with hemochromatosis is the HFE gene. HFE-associated hereditary hemochromatosis (HFE-HH) is inherited in an autosomal recessive pattern, meaning an individual must have a mutation in both copies of the HFE gene to have hemochromatosis. A carrier is an individual who has one working copy of the HFE gene without a mutation and one non-working copy of the HFE gene that contains a mutation. Carriers are not expected to have symptoms of HFE-HH. However, when two carriers for HFE-HH have children together, or a carrier and an individual with HFE-HH have a child together, there is an increased chance the child will inherit two mutations and a genetic predisposition to HFE-HH. For example, if two individuals are both a carrier, there would be a 25% chance for each of their children to inherited a mutation in both copies of their HFE gene and be at increased risk for hemochromatosis. Somewhere around 1/9 (11%) of individuals with  ancestry are carriers for HFE-HH.     There are different exact mutations in the HFE gene, which can have different associated risks. One of the most common mutations is the C282Y mutation. Individuals who have this mutation on both copies of their gene (called homozygous) are at increased risk for hemochromatosis. However, individuals with this mutation on both copies of the gene will not always develop any symptoms. An estimated ~28% of C282Y homozygous men and lower percentage of C282Y homozygous women will develop disease. Individuals with combinations of HFE variants on both copies of the gene other than C282Y/C282Y may have elevated serum TS or serum ferritin concentrations but have smaller chance to develop complications of iron overload. Carriers do not seem to develop iron overload but may occasionally have abnormal serum  iron studies.     Carina's Testing Results:  We reviewed Carina's past genetic testing results, which found that she has one of the tested HFE mutations (aka one copy of the gene with a mutation). Specifically, she was found to be heterozygous (aka one copy) for the C282Y mutation. She was not found to have either of the other 2 HFE mutations tested for. Therefore, we expect Carina is very likely a carrier only for hemochromatosis.    While C282Y heterozygosity is associated with subtle changes in iron metabolism, it does not appear to contribute significantly to iron overload or hemochromatosis. Therefore, we discussed that it is unlikely that this result would be associated with any health implications for Carina's personally.      Family Implications:  We discussed that it is most likely that Marios children with hemochromatosis both have two copies of the C282Y mutation, one from each of their parents. Therefore, their father would also be at least a carrier for hemochromatosis. Genetic testing or iron/ferritin testing would be recommended for him if not done prior for more information about if he is a carrier only vs if he could have two mutations.    If Carina's children's father is also a carrier only (aka both their parents are carriers only), there is a 25% chance for each pregnancy that a child would inherit the two non-working copies from parents and have increased risk for hemochromatosis. There would also be a 50% chance for a child to be a carrier only, and a 25% chance for a child to be a non-affected non-carrier. No one has control over which copy they pass on to their child since it is a random process. Therefore, testing would be recommended for all of Alessio children as they would have at least a 25% chance to have two copies of the gene/increased risk for hemochromatosis based on the family history.     Other extended family members, including Carina's siblings, may also have an  "increased chance to have inherited this mutation as well. Someone's exact chance to have hemochromatosis vs be a carrier vs neither depends on if both of their parents were a carrier/affected or not. Testing would be available for extended family members based on the family history.     Any individual found to have 2 HFE mutations would be at increased risk for hemochromatosis, and would be recommended to follow up with their primary care provider or a specialist for management. This information would be important for their management and monitoring for the early signs of HFE-HH that could lead to the development of clinical symptoms of the disease if left untreated, and would include iron levels being checked. It is very important to get iron levels down to normal if elevated for these individuals. Carrier testing would be available for their partner for more information on their chance to have a child with increased risk of hemochromatosis (if of reproductive age or if they have children).      Any individual found to have 1 HFE mutation/carrier could have a child with 2 mutations and an increased risk for hemochromatosis IF their partner also carried a mutation in this gene. If a family member is found to be a carrier, their partner could also then have carrier testing for more information on their chance to have a child with increased risk of hemochromatosis (if of reproductive age or if they have children).      We discussed briefly that a family member's PCP may be able to order testing for hemochromatosis. They also could be seen by a genetic counselor to assist with testing. Carina is welcome to share my contact information with family if they have questions about testing.     Additional Discussion:  Carina had several other good questions today. For one, she questioned benefits of direct-to-consumer genetic testing, such as [employer]. We discussed that this is not clinically testing, so is more often \"for " "fun\" rather than for medical management.     Carina also had questions about security of medical records and testing. Clinical genetic testing and providers' notes would be documented in [facility] electronic medical record, but are protected health information (PHI). Family members' names and identifying information would not be documented without permission.     There are some direct-to-consumer genetic tests not done through a provider/hospital system that may look for some hemochromatosis mutations (ex. I believe [employer] looks for one or two hemochromatosis mutations) which could be pursued by an individual directly, though I would still recommend clinical genetic testing through a provider for most accurate information for family members.    We discussed that some symptoms are not thought to have a single underlying genetic cause (ex. osteoporosis), so good genetic testing does not exist for them at this time. Multiple known and unknown genetic and non-genetics factors may contribute to some of these conditions.    Another role of our Genetics team is to have support resources available at each stage for an individual and discuss these as the family wishes. If support resources are helpful (medical literature, family connections, professional counseling resources, etc.) at any time, we are happy to assist. Some good resources for hemachromatosis are: https://www.hemochromatosis.org/ and https://medlineplus.gov/genetics/condition/hereditary-hemochromatosis.        It was a pleasure to meet with Carina virtually today. She had no additional questions at this time. Contact information was shared for any future questions or concerns that arise.     Plan:   1. Carina's genetic testing results and the genetics of hemochromatosis were reviewed today.  2. Hemachromatosis testing would be recommended for each of Carina's children.  3. Testing would also be available for Carina's children's father and her " extended family. Family members are also welcome to contact me with questions.  4. Contact information was provided should any questions arise in the future.      Jessy Apodaca MS, [facility]  Genetic Counselor  Division of Genetics and Metabolism  [facility]   Phone: [phone]  Pager: [phone]        CC: PCP; Dr. Ismael David Time Spent on Phone: 70 min

## 2021-07-02 NOTE — PROGRESS NOTES
Carina is a 75 year old who is being evaluated via a billable telephone visit.      What phone number would you like to be contacted at? [phone]  How would you like to obtain your AVS? Mail a copy       Elizabeth Blount M.A.

## 2021-07-07 NOTE — TELEPHONE ENCOUNTER
"Last visit: 6/3/2021-Dr. Hernandez.    Last visit with PCP: 12/1/2020-Dr Lorri Barbosa    Patient requesting referral for \"hearing test\" see patient message    "

## 2021-07-29 NOTE — PROGRESS NOTES
Presenting information:   Carina Hernandez is a 75 year of female with a family history of hemochromatosis. She was referred for a genetics evaluation by Dr. Ismael Hernandez and was seen today virtually at the [facility]. I met with Carina by telephone to obtain a personal and family history, review her genetic testing results, and discuss the genetics and inheritance of hemochromatosis.     Personal History:  Carina had initial testing including ferritin, iron, TIBC and transferrin ordered due to her family history. Based on these results (elevated ferritin level, normal iron level, low transferrin) Dr. Hernandez had ordered additional genetic testing. See Dr. Hernandez' encounters for more details. 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 The H63D and S65C mutations were not detected.     Additional history includes mild chronic kidney disease diagnosed 3-4 years ago (etiology reportedly unknown), possible osteoporosis, and Lyme disease with symptoms.     Family History:   A pedigree was obtained today and discussed with Carina. Carina requested it not be detailed in my clinic note or scanned into her electronic medical records. Carina was aware other providers would therefore not be able to see my family history and would have to obtain pertinent family history information themselves.     Family history of hemochromatosis (Carina's children) was the reason for referral today. These family members reportedly had positive genetic testing (I do not have a copy to confirm their exact results).     Background:   Genes are long stretches of DNA that are responsible for how our bodies look and how our bodies work. We all have two copies of every gene, one inherited from our mother and one inherited from our father. When there is a change, called a mutation, in a gene it can cause it to not do its job correctly which can cause the  signs and symptoms of a genetic condition. 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     Hemochromatosis is a variable adult-onset condition that is caused by an over-absorption of iron. Without proper treatment and management, extra iron can be stored in other parts of the body including the heart, liver, pancreas, joints, and skin, which can lead to damage to these organs and tissues. Early signs of hemochromatosis include abdominal and joint pain, fatigue, lethargy, and weight loss. As the disease progresses, individuals can experience heart and liver disease, diabetes, and arthritis. Periodic therapeutic blood removal, or phlebotomy, is the most common means of iron reduction/treatment. Not every individual who carries mutations in HFE gene will develop hemochromatosis, and even those who do in a family may have variability in exact symptoms and severity.     The most common gene associated with hemochromatosis is the HFE gene. HFE-associated hereditary hemochromatosis (HFE-HH) is inherited in an autosomal recessive pattern, meaning an individual must have a mutation in both copies of the HFE gene to have hemochromatosis. A carrier is an individual who has one working copy of the HFE gene without a mutation and one non-working copy of the HFE gene that contains a mutation. Carriers are not expected to have symptoms of HFE-HH. However, when two carriers for HFE-HH have children together, or a carrier and an individual with HFE-HH have a child together, there is an increased chance the child will inherit two mutations and a genetic predisposition to HFE-HH. For example, if two individuals are both a carrier, there would be a 25% chance for each of their children to inherited a mutation in both copies of their HFE gene and be at increased risk for hemochromatosis. Somewhere around 1/9 (11%) of individuals with  ancestry are carriers for HFE-HH.     There are different exact  mutations in the HFE gene, which can have different associated risks. One of the most common mutations is the C282Y mutation. Individuals who have this mutation on both copies of their gene (called homozygous) are at increased risk for hemochromatosis. However, individuals with this mutation on both copies of the gene will not always develop any symptoms. An estimated ~28% of C282Y homozygous men and lower percentage of C282Y homozygous women will develop disease. Individuals with combinations of HFE variants on both copies of the gene other than C282Y/C282Y may have elevated serum TS or serum ferritin concentrations but have smaller chance to develop complications of iron overload. Carriers do not seem to develop iron overload but may occasionally have abnormal serum iron studies.     [facility] Testing Results:  We reviewed Carina's past genetic testing results, which found that she has one of the tested HFE mutations (aka one copy of the gene with a mutation). Specifically, she was found to be heterozygous (aka one copy) for the C282Y mutation. She was not found to have either of the other 2 HFE mutations tested for. Therefore, we expect Carina is very likely a carrier only for hemochromatosis.    While C282Y heterozygosity is associated with subtle changes in iron metabolism, it does not appear to contribute significantly to iron overload or hemochromatosis. Therefore, we discussed that it is unlikely that this result would be associated with any health implications for Carina's personally.      Family Implications:  We discussed that it is most likely that Marios children with hemochromatosis both have two copies of the C282Y mutation, one from each of their parents. Therefore, their father would also be at least a carrier for hemochromatosis. Genetic testing or iron/ferritin testing would be recommended for him if not done prior for more information about if he is a carrier only vs if he could have two  mutations.    If Marios children's father is also a carrier only (aka both their parents are carriers only), there is a 25% chance for each pregnancy that a child would inherit the two non-working copies from parents and have increased risk for hemochromatosis. There would also be a 50% chance for a child to be a carrier only, and a 25% chance for a child to be a non-affected non-carrier. No one has control over which copy they pass on to their child since it is a random process. Therefore, testing would be recommended for all of Marios children as they would have at least a 25% chance to have two copies of the gene/increased risk for hemochromatosis based on the family history.     Other extended family members, including Carina's siblings, may also have an increased chance to have inherited this mutation as well. Someone's exact chance to have hemochromatosis vs be a carrier vs neither depends on if both of their parents were a carrier/affected or not. Testing would be available for extended family members based on the family history.     Any individual found to have 2 HFE mutations would be at increased risk for hemochromatosis, and would be recommended to follow up with their primary care provider or a specialist for management. This information would be important for their management and monitoring for the early signs of HFE-HH that could lead to the development of clinical symptoms of the disease if left untreated, and would include iron levels being checked. It is very important to get iron levels down to normal if elevated for these individuals. Carrier testing would be available for their partner for more information on their chance to have a child with increased risk of hemochromatosis (if of reproductive age or if they have children).      Any individual found to have 1 HFE mutation/carrier could have a child with 2 mutations and an increased risk for hemochromatosis IF their partner also carried a  "mutation in this gene. If a family member is found to be a carrier, their partner could also then have carrier testing for more information on their chance to have a child with increased risk of hemochromatosis (if of reproductive age or if they have children).      We discussed briefly that a family member's PCP may be able to order testing for hemochromatosis. They also could be seen by a genetic counselor to assist with testing. Carina is welcome to share my contact information with family if they have questions about testing.     Additional Discussion:  Carina had several other good questions today. For one, she questioned benefits of direct-to-consumer genetic testing, such as [employer]. We discussed that this is not clinically testing, so is more often \"for fun\" rather than for medical management.     Carina also had questions about security of medical records and testing. Clinical genetic testing and providers' notes would be documented in [facility] electronic medical record, but are protected health information (PHI). Family members' names and identifying information would not be documented without permission.     There are some direct-to-consumer genetic tests not done through a provider/hospital system that may look for some hemochromatosis mutations (ex. I believe [employer]And[employer] looks for one or two hemochromatosis mutations) which could be pursued by an individual directly, though I would still recommend clinical genetic testing through a provider for most accurate information for family members.    We discussed that some symptoms are not thought to have a single underlying genetic cause (ex. osteoporosis), so good genetic testing does not exist for them at this time. Multiple known and unknown genetic and non-genetics factors may contribute to some of these conditions.    Another role of our Genetics team is to have support resources available at each stage for an individual and discuss these as the " family wishes. If support resources are helpful (medical literature, family connections, professional counseling resources, etc.) at any time, we are happy to assist. Some good resources for hemachromatosis are: https://www.hemochromatosis.org/ and https://medlineplus.gov/genetics/condition/hereditary-hemochromatosis.        It was a pleasure to meet with Carina virtually today. She had no additional questions at this time. Contact information was shared for any future questions or concerns that arise.     Plan:   1. Carina's genetic testing results and the genetics of hemochromatosis were reviewed today.  2. Hemachromatosis testing would be recommended for each of Carina's children.  3. Testing would also be available for Carina's children's father and her extended family. Family members are also welcome to contact me with questions.  4. Contact information was provided should any questions arise in the future.      Jessy Apodaca MS, [facility]  Genetic Counselor  Division of Genetics and Metabolism  [facility]   Phone: [phone]  Pager: [phone]        CC: PCP; Dr. Ismael Hernandez; Carina  Approx Time Spent on Phone: 70 min

## 2021-09-13 PROCEDURE — 51702 INSERT TEMP BLADDER CATH: CPT

## 2021-09-13 PROCEDURE — 96374 THER/PROPH/DIAG INJ IV PUSH: CPT

## 2021-09-13 PROCEDURE — 96375 TX/PRO/DX INJ NEW DRUG ADDON: CPT

## 2021-09-13 PROCEDURE — 99285 EMERGENCY DEPT VISIT HI MDM: CPT

## 2021-09-13 NOTE — ED TRIAGE NOTES
"Pt reports that she was lifting a heavy box last week and noticed a \"twinge\" in her lower left back. This morning, she was lifting her phone to the left ear and she developed \"excruciating\" lower left back pain. Pt states that when she is completely at rest she has no pain, but with any movement it is severe.   "

## 2021-09-14 ENCOUNTER — APPOINTMENT (OUTPATIENT)
Dept: CT IMAGING | Facility: HOSPITAL | Age: 76
End: 2021-09-14
Attending: EMERGENCY MEDICINE
Payer: MEDICARE

## 2021-09-14 ENCOUNTER — HOSPITAL ENCOUNTER (EMERGENCY)
Facility: HOSPITAL | Age: 76
Discharge: HOME | End: 2021-09-14
Attending: EMERGENCY MEDICINE | Admitting: EMERGENCY MEDICINE
Payer: MEDICARE

## 2021-09-14 VITALS
HEART RATE: 62 BPM | BODY MASS INDEX: 20.7 KG/M2 | RESPIRATION RATE: 21 BRPM | WEIGHT: 115 LBS | OXYGEN SATURATION: 98 % | DIASTOLIC BLOOD PRESSURE: 58 MMHG | SYSTOLIC BLOOD PRESSURE: 124 MMHG | TEMPERATURE: 99.1 F

## 2021-09-14 DIAGNOSIS — K59.00 CONSTIPATION, UNSPECIFIED CONSTIPATION TYPE: ICD-10-CM

## 2021-09-14 DIAGNOSIS — R10.9 FLANK PAIN: ICD-10-CM

## 2021-09-14 DIAGNOSIS — R33.8 ACUTE URINARY RETENTION: ICD-10-CM

## 2021-09-14 LAB
ALBUMIN SERPL-MCNC: 4 G/DL (ref 3.5–5)
ALBUMIN UR-MCNC: NEGATIVE MG/DL
ALP SERPL-CCNC: 77 U/L (ref 45–120)
ALT SERPL W P-5'-P-CCNC: <9 U/L (ref 0–45)
ANION GAP SERPL CALCULATED.3IONS-SCNC: 13 MMOL/L (ref 5–18)
APPEARANCE UR: CLEAR
AST SERPL W P-5'-P-CCNC: 23 U/L (ref 0–40)
ATRIAL RATE - MUSE: 67 BPM
BASOPHILS # BLD AUTO: 0 10E3/UL (ref 0–0.2)
BASOPHILS NFR BLD AUTO: 0 %
BILIRUB SERPL-MCNC: 0.9 MG/DL (ref 0–1)
BILIRUB UR QL STRIP: NEGATIVE
BUN SERPL-MCNC: 11 MG/DL (ref 8–28)
C REACTIVE PROTEIN LHE: <0.1 MG/DL (ref 0–0.8)
CALCIUM SERPL-MCNC: 9.2 MG/DL (ref 8.5–10.5)
CHLORIDE BLD-SCNC: 103 MMOL/L (ref 98–107)
CO2 SERPL-SCNC: 23 MMOL/L (ref 22–31)
COLOR UR AUTO: ABNORMAL
CREAT SERPL-MCNC: 0.78 MG/DL (ref 0.6–1.1)
DIASTOLIC BLOOD PRESSURE - MUSE: 59 MMHG
EOSINOPHIL # BLD AUTO: 0 10E3/UL (ref 0–0.7)
EOSINOPHIL NFR BLD AUTO: 0 %
ERYTHROCYTE [DISTWIDTH] IN BLOOD BY AUTOMATED COUNT: 13.2 % (ref 10–15)
GFR SERPL CREATININE-BSD FRML MDRD: 74 ML/MIN/1.73M2
GLUCOSE BLD-MCNC: 91 MG/DL (ref 70–125)
GLUCOSE UR STRIP-MCNC: NEGATIVE MG/DL
HCT VFR BLD AUTO: 43.5 % (ref 35–47)
HGB BLD-MCNC: 14.8 G/DL (ref 11.7–15.7)
HGB UR QL STRIP: NEGATIVE
IMM GRANULOCYTES # BLD: 0 10E3/UL
IMM GRANULOCYTES NFR BLD: 0 %
INTERPRETATION ECG - MUSE: NORMAL
KETONES UR STRIP-MCNC: 60 MG/DL
LEUKOCYTE ESTERASE UR QL STRIP: NEGATIVE
LIPASE SERPL-CCNC: 43 U/L (ref 0–52)
LYMPHOCYTES # BLD AUTO: 1.3 10E3/UL (ref 0.8–5.3)
LYMPHOCYTES NFR BLD AUTO: 25 %
MCH RBC QN AUTO: 31.8 PG (ref 26.5–33)
MCHC RBC AUTO-ENTMCNC: 34 G/DL (ref 31.5–36.5)
MCV RBC AUTO: 93 FL (ref 78–100)
MONOCYTES # BLD AUTO: 0.4 10E3/UL (ref 0–1.3)
MONOCYTES NFR BLD AUTO: 8 %
MUCOUS THREADS #/AREA URNS LPF: PRESENT /LPF
NEUTROPHILS # BLD AUTO: 3.5 10E3/UL (ref 1.6–8.3)
NEUTROPHILS NFR BLD AUTO: 67 %
NITRATE UR QL: NEGATIVE
NRBC # BLD AUTO: 0 10E3/UL
NRBC BLD AUTO-RTO: 0 /100
P AXIS - MUSE: 54 DEGREES
PH UR STRIP: 6.5 [PH] (ref 5–7)
PLATELET # BLD AUTO: 193 10E3/UL (ref 150–450)
POTASSIUM BLD-SCNC: 3.4 MMOL/L (ref 3.5–5)
PR INTERVAL - MUSE: 142 MS
PROT SERPL-MCNC: 6.8 G/DL (ref 6–8)
QRS DURATION - MUSE: 88 MS
QT - MUSE: 502 MS
QTC - MUSE: 530 MS
R AXIS - MUSE: 66 DEGREES
RBC # BLD AUTO: 4.66 10E6/UL (ref 3.8–5.2)
RBC URINE: 3 /HPF
SODIUM SERPL-SCNC: 139 MMOL/L (ref 136–145)
SP GR UR STRIP: 1.02 (ref 1–1.03)
SQUAMOUS EPITHELIAL: <1 /HPF
SYSTOLIC BLOOD PRESSURE - MUSE: 103 MMHG
T AXIS - MUSE: 50 DEGREES
UROBILINOGEN UR STRIP-MCNC: <2 MG/DL
VENTRICULAR RATE- MUSE: 67 BPM
WBC # BLD AUTO: 5.3 10E3/UL (ref 4–11)
WBC URINE: 3 /HPF

## 2021-09-14 PROCEDURE — 82040 ASSAY OF SERUM ALBUMIN: CPT | Performed by: EMERGENCY MEDICINE

## 2021-09-14 PROCEDURE — 86141 C-REACTIVE PROTEIN HS: CPT | Performed by: EMERGENCY MEDICINE

## 2021-09-14 PROCEDURE — 93005 ELECTROCARDIOGRAM TRACING: CPT | Mod: 59 | Performed by: EMERGENCY MEDICINE

## 2021-09-14 PROCEDURE — 36415 COLL VENOUS BLD VENIPUNCTURE: CPT | Performed by: EMERGENCY MEDICINE

## 2021-09-14 PROCEDURE — 96374 THER/PROPH/DIAG INJ IV PUSH: CPT | Mod: 59

## 2021-09-14 PROCEDURE — 51702 INSERT TEMP BLADDER CATH: CPT

## 2021-09-14 PROCEDURE — 83690 ASSAY OF LIPASE: CPT | Performed by: EMERGENCY MEDICINE

## 2021-09-14 PROCEDURE — 250N000013 HC RX MED GY IP 250 OP 250 PS 637: Performed by: EMERGENCY MEDICINE

## 2021-09-14 PROCEDURE — 96375 TX/PRO/DX INJ NEW DRUG ADDON: CPT

## 2021-09-14 PROCEDURE — 81003 URINALYSIS AUTO W/O SCOPE: CPT | Performed by: EMERGENCY MEDICINE

## 2021-09-14 PROCEDURE — 250N000011 HC RX IP 250 OP 636: Performed by: EMERGENCY MEDICINE

## 2021-09-14 PROCEDURE — 72131 CT LUMBAR SPINE W/O DYE: CPT

## 2021-09-14 PROCEDURE — 74176 CT ABD & PELVIS W/O CONTRAST: CPT

## 2021-09-14 PROCEDURE — 85025 COMPLETE CBC W/AUTO DIFF WBC: CPT | Performed by: EMERGENCY MEDICINE

## 2021-09-14 RX ORDER — ACETAMINOPHEN 325 MG/1
975 TABLET ORAL ONCE
Status: COMPLETED | OUTPATIENT
Start: 2021-09-14 | End: 2021-09-14

## 2021-09-14 RX ORDER — MORPHINE SULFATE 2 MG/ML
2 INJECTION, SOLUTION INTRAMUSCULAR; INTRAVENOUS ONCE
Status: DISCONTINUED | OUTPATIENT
Start: 2021-09-14 | End: 2021-09-14

## 2021-09-14 RX ORDER — ONDANSETRON 2 MG/ML
4 INJECTION INTRAMUSCULAR; INTRAVENOUS ONCE
Status: COMPLETED | OUTPATIENT
Start: 2021-09-14 | End: 2021-09-14

## 2021-09-14 RX ORDER — MORPHINE SULFATE 4 MG/ML
4 INJECTION, SOLUTION INTRAMUSCULAR; INTRAVENOUS ONCE
Status: COMPLETED | OUTPATIENT
Start: 2021-09-14 | End: 2021-09-14

## 2021-09-14 RX ORDER — DOCUSATE SODIUM 100 MG/1
100 CAPSULE, LIQUID FILLED ORAL 2 TIMES DAILY PRN
Qty: 20 CAPSULE | Refills: 0 | Status: SHIPPED | OUTPATIENT
Start: 2021-09-14 | End: 2021-09-24

## 2021-09-14 RX ADMIN — ONDANSETRON 4 MG: 2 INJECTION INTRAMUSCULAR; INTRAVENOUS at 02:39

## 2021-09-14 RX ADMIN — MORPHINE SULFATE 4 MG: 4 INJECTION INTRAVENOUS at 02:41

## 2021-09-14 RX ADMIN — ACETAMINOPHEN 975 MG: 325 TABLET ORAL at 06:44

## 2021-09-14 ASSESSMENT — ENCOUNTER SYMPTOMS
SHORTNESS OF BREATH: 0
CHILLS: 0
NAUSEA: 0
BACK PAIN: 1
VOMITING: 0
FEVER: 0

## 2021-09-14 NOTE — ED PROVIDER NOTES
Emergency Department Encounter      NAME: Carina Hernadnez  AGE: 75 year old female  YOB: 1945  MRN: [id number]  EVALUATION DATE & TIME: 2021  1:36 AM    PCP: Alonzo Barbosa    ED PROVIDER: Aric Montalvo M.D.      Chief Complaint   Patient presents with     Back Pain         FINAL IMPRESSION:  1. Acute urinary retention    2. Constipation, unspecified constipation type    3. Flank pain        MEDICAL DECISION MAKIN:07 AM I met with the patient, obtained history, performed an initial exam, and discussed options and plan for diagnostics and treatment here in the ED.   Pertinent Labs & Imaging studies reviewed. (See chart for details)  4:35 AM I updated the patient.       This patient is a 75-year-old female with a history of chronic kidney disease and osteoporosis who presents with left flank pain.  She says that she has experienced some mild pain earlier in the week when she lifted up a heavy box but then the pain seemed to get worse this evening.  She rated 9 out of 10.  She has not had any radiation of the pain.  No radicular symptoms.  No numbness or weakness of the legs.  No bladder or bowel incontinence.  No saddle anesthesia.  No history of cancer.  No fevers or chills.  No dysuria or frequency.  On exam she is tender in the left flank and seemed to be worse with any sort of movement.  Initially there were some erroneous vitals noted but when these repeated she was slightly hypertensive and with a heart rate in the 60s.  A CT scan of the abdomen was done which showed constipation as well as acute urinary retention.  I also did a CT scan of the lumbar spine which did not show any cause for her left flank pain.  A Escamilla catheter will be placed in the ER and after she has had a chance to diurese today will be changed to a leg bag and she will be discharged home.  I have referred her to urology and she is aware that she needs to keep the Escamilla catheter in place for the next few days  "until she has a chance to see urology.  The patient is using magnesium for her constipation and did not want any additional prescriptions although I did provide a prescription for Colace on her discharge orders.  She has not tolerated MiraLAX in the past and did not want this.    The importance of close follow up was discussed. We reviewed warning signs and symptoms, and I instructed Ms. Hernandez to return to the emergency department immediately if she develops any new or worsening symptoms. I provided additional verbal discharge instructions. Ms. Hernandez expressed understanding and agreement with this plan of care, her questions were answered, and she was discharged in stable condition.       PPE: Provider wore gloves, N95 mask, eye protection, surgical cap, and paper mask.  MEDICATIONS GIVEN IN THE EMERGENCY:  Medications   morphine (PF) injection 4 mg (4 mg Intravenous Given 9/14/21 0241)   ondansetron (ZOFRAN) injection 4 mg (4 mg Intravenous Given 9/14/21 0239)       NEW PRESCRIPTIONS STARTED AT TODAY'S ER VISIT:  New Prescriptions    DOCUSATE SODIUM (COLACE) 100 MG CAPSULE    Take 1 capsule (100 mg) by mouth 2 times daily as needed for constipation          =================================================================    HPI    Patient information was obtained from: Patient    Use of : N/A        Carina Hernandez is a 75 year old female with a past medical history of chronic kidney disease stage 2, Lymes disease, hyperlipidemia, osteoporosis, hypotension, and lung nodules in [facility] who presents to the ED via private car for evaluation of back pain.    This morning (9/13), the patient began to endorse back pain. She was sitting down and twisted to see something on her computer when she began to feel the pain. Additionally, earlier in the week, she lifted a heavy package, and heard a \"small tweek.\" She believes the back pain could be related to this incident. The pain is localized on the lower left " side of her back and radiates across her back. Currently, her pain is at 3/10, but when she moves her back muscles at all it goes up to 9/10 in intensity. Her blood pressure is currently recorded at 173/128. The patient denies fever, chills, nausea, vomiting, dysuria, urinary problems, increased urinary frequency, leg pain, chest pain, shortness of breath, or any other complaints at this time.    REVIEW OF SYSTEMS   Review of Systems   Constitutional: Negative for chills and fever.   Respiratory: Negative for shortness of breath.    Cardiovascular: Negative for chest pain.   Gastrointestinal: Negative for nausea and vomiting.   Genitourinary: Negative.    Musculoskeletal: Positive for back pain.        Negative for leg pain.   All other systems reviewed and are negative.       PAST MEDICAL HISTORY:  No past medical history on file.    PAST SURGICAL HISTORY:  Past Surgical History:   Procedure Laterality Date     BAND HEMORRHOIDECTOMY       OTHER SURGICAL HISTORY      left wrist surgery     TUBAL LIGATION         CURRENT MEDICATIONS:    No current facility-administered medications for this encounter.    Current Outpatient Medications:      docusate sodium (COLACE) 100 MG capsule, Take 1 capsule (100 mg) by mouth 2 times daily as needed for constipation, Disp: 20 capsule, Rfl: 0     ASTAXANTHIN ORAL, [ASTAXANTHIN ORAL] Take by mouth., Disp: , Rfl:      MAGNESIUM CARBONATE MISC, [MAGNESIUM CARBONATE MISC] Use As Directed., Disp: , Rfl:      UNABLE TO FIND, [UNABLE TO FIND] Med Name: Eye Protection  Basics, Disp: , Rfl:      UNABLE TO FIND, [UNABLE TO FIND] Med Name: Yevgeniy, Disp: , Rfl:     ALLERGIES:  Allergies   Allergen Reactions     Lactase Unknown     PN: Congestion and nasal drip     Wheat [Wheat Bran] Muscle Pain (Myalgia)     PN: Fatigue     Latex Rash       FAMILY HISTORY:  Family History   Problem Relation Age of Onset     Breast Cancer Mother      Osteoporosis Mother      Hernia Mother      Hypertension  Mother      Heart Disease Father      Cerebrovascular Disease Father      Breast Cancer Sister      Hemochromatosis Son      Hemochromatosis Daughter        SOCIAL HISTORY:   Social History     Socioeconomic History     Marital status: Single     Spouse name: Not on file     Number of children: Not on file     Years of education: Not on file     Highest education level: Not on file   Occupational History     Not on file   Tobacco Use     Smoking status: Never Smoker     Smokeless tobacco: Never Used   Substance and Sexual Activity     Alcohol use: Not Currently     Drug use: Never     Sexual activity: Not on file   Other Topics Concern     Not on file   Social History Narrative    Previously from [place], then [place], and now in [place].    She has a daughter in [place], as well as another daughter and son.    Dr. Morgan 07/25/20        Social Determinants of Health     Financial Resource Strain:      Difficulty of Paying Living Expenses:    Food Insecurity:      Worried About Running Out of Food in the Last Year:      Ran Out of Food in the Last Year:    Transportation Needs:      Lack of Transportation (Medical):      Lack of Transportation (Non-Medical):    Physical Activity:      Days of Exercise per Week:      Minutes of Exercise per Session:    Stress:      Feeling of Stress :    Social Connections:      Frequency of Communication with Friends and Family:      Frequency of Social Gatherings with Friends and Family:      Attends Worship Services:      Active Member of Clubs or Organizations:      Attends Club or Organization Meetings:      Marital Status:    Intimate Partner Violence:      Fear of Current or Ex-Partner:      Emotionally Abused:      Physically Abused:      Sexually Abused:        PHYSICAL EXAM:    Vitals: BP (!) 143/78   Pulse 65   Temp 99.1  F (37.3  C) (Temporal)   Resp (!) 36   Wt 52.2 kg (115 lb)   SpO2 97%   BMI 20.70 kg/m     Constitutional: Well developed, well  nourished. Comfortable appearing.  HENT: Normocephalic, atraumatic, mucous membranes moist, nose normal.   Neck- Supple, gross ROM intact.   Eyes: Pupils mid-range, sclera white, no discharge  Respiratory: Clear to auscultation bilaterally, no respiratory distress, no wheezing, speaks full sentences easily.  Cardiovascular: Normal heart rate, regular rhythm, no murmurs. No lower extremity edema, 2+ DP pulses.   GI: Soft, no tenderness to deep palpation in all quadrants, no masses.  Musculoskeletal: Moving all 4 extremities intentionally and without pain. No obvious deformity.  Back: No midline tenderness.Left CVA tenderness.  Skin: Warm, dry, no rash.  Neurologic: Alert & oriented x 3, speech clear, moving all extremities spontaneously. Motor sensation intact throughout lower extremity.  Psychiatric: Affect normal, cooperative.     LAB:  All pertinent labs reviewed and interpreted.  Labs Ordered and Resulted from Time of ED Arrival Up to the Time of Departure from the ED   COMPREHENSIVE METABOLIC PANEL - Abnormal; Notable for the following components:       Result Value    Potassium 3.4 (*)     All other components within normal limits   CRP INFLAMMATION - Normal   LIPASE - Normal   CBC WITH PLATELETS & DIFFERENTIAL    Narrative:     The following orders were created for panel order CBC with platelets differential.  Procedure                               Abnormality         Status                     ---------                               -----------         ------                     CBC with platelets and d...[[id number]]                      Final result                 Please view results for these tests on the individual orders.   CBC WITH PLATELETS AND DIFFERENTIAL   ROUTINE UA WITH MICROSCOPIC REFLEX TO CULTURE   PERIPHERAL IV CATHETER   CARDIAC CONTINUOUS MONITORING   PULSE OXIMETRY NURSING   IP ACUTE INDWELLING URINARY CATHETER (HARDING)       RADIOLOGY:  CT Lumbar Spine w/o Contrast   Final Result    IMPRESSION:   1.  No fracture or posttraumatic subluxation.   2.  Multilevel degenerative changes as highlighted above.      CT Abdomen Pelvis w/o Contrast   Final Result   IMPRESSION:    1.  No evidence of nephroureterolithiasis.   2.  Significant bladder distention.   3.  Bibasilar atelectasis with small left effusion.   4.  Nonvisualized appendix. Large fecal burden.             EKG:   Performed at: 14-SEP-2021  Impression: Sinus rhythm with sinus arrhythmia. Low voltage QRS. Nonspecific T wave abnormality. Prolonged QT, abnormal ECG.   Rate: 67 bpm  Rhythm: Sinus rhythm with sinus arrhythmia  QRS Interval: 88ms  QTc Interval: 530 ms  Comparison: No previous ECG available.  I have independently reviewed and interpreted the EKG(s) documented above.     I, Carey Brambila, am serving as a scribe to document services personally performed by Dr. Airc Montalvo based on my observation and the provider's statements to me. I, Aric Montalvo M.D. attest that Carey Brambila is acting in a scribe capacity, has observed my performance of the services and has documented them in accordance with my direction.      Aric Montalvo M.D.  Emergency Medicine  [facility] EMERGENCY DEPARTMENT  [address] [facility] [id number] [phone]  Dept: [phone]     Aric Montalvo MD  09/14/21 0560

## 2021-09-14 NOTE — ED NOTES
Pt reports pain in the left flank area 9/10. Pain is worse with movement. Assist of 2 to get the pt into the bed. Denies hx of kidney stones.

## 2021-09-15 ENCOUNTER — NURSE TRIAGE (OUTPATIENT)
Dept: NURSING | Facility: CLINIC | Age: 76
End: 2021-09-15

## 2021-09-16 ENCOUNTER — HOSPITAL ENCOUNTER (EMERGENCY)
Facility: HOSPITAL | Age: 76
Discharge: HOME | End: 2021-09-16
Attending: EMERGENCY MEDICINE | Admitting: EMERGENCY MEDICINE
Payer: MEDICARE

## 2021-09-16 VITALS
WEIGHT: 115 LBS | OXYGEN SATURATION: 99 % | SYSTOLIC BLOOD PRESSURE: 136 MMHG | BODY MASS INDEX: 22.58 KG/M2 | RESPIRATION RATE: 18 BRPM | DIASTOLIC BLOOD PRESSURE: 83 MMHG | HEIGHT: 60 IN | TEMPERATURE: 98.3 F | HEART RATE: 66 BPM

## 2021-09-16 DIAGNOSIS — T83.9XXA FOLEY CATHETER PROBLEM, INITIAL ENCOUNTER (H): ICD-10-CM

## 2021-09-16 DIAGNOSIS — R31.9 HEMATURIA, UNSPECIFIED TYPE: ICD-10-CM

## 2021-09-16 LAB
ALBUMIN UR-MCNC: NEGATIVE MG/DL
APPEARANCE UR: CLEAR
BACTERIA #/AREA URNS HPF: ABNORMAL /HPF
BILIRUB UR QL STRIP: NEGATIVE
COLOR UR AUTO: COLORLESS
GLUCOSE UR STRIP-MCNC: NEGATIVE MG/DL
HGB UR QL STRIP: ABNORMAL
KETONES UR STRIP-MCNC: NEGATIVE MG/DL
LEUKOCYTE ESTERASE UR QL STRIP: ABNORMAL
NITRATE UR QL: NEGATIVE
PH UR STRIP: 6 [PH] (ref 5–7)
RBC URINE: 21 /HPF
SP GR UR STRIP: 1 (ref 1–1.03)
UROBILINOGEN UR STRIP-MCNC: <2 MG/DL
WBC URINE: 2 /HPF

## 2021-09-16 PROCEDURE — 81001 URINALYSIS AUTO W/SCOPE: CPT | Performed by: EMERGENCY MEDICINE

## 2021-09-16 PROCEDURE — 99283 EMERGENCY DEPT VISIT LOW MDM: CPT

## 2021-09-16 ASSESSMENT — ENCOUNTER SYMPTOMS
NUMBNESS: 0
HEADACHES: 0
ARTHRALGIAS: 0
NECK STIFFNESS: 0
DYSURIA: 1
ABDOMINAL PAIN: 0
DIFFICULTY URINATING: 0
SHORTNESS OF BREATH: 0
HEMATURIA: 1
COLOR CHANGE: 0
CONFUSION: 0
VOMITING: 0
FEVER: 0

## 2021-09-16 ASSESSMENT — MIFFLIN-ST. JEOR: SCORE: 928.14

## 2021-09-16 NOTE — ED NOTES
Deflated balloon, only 8.5cc saline removed.  Reinflated with 10cc saline, having patient move about room and will check pad.

## 2021-09-16 NOTE — DISCHARGE INSTRUCTIONS
Keep the Escamilla catheter in place. We inflated the balloon more today so it should not leak any more. The blood in your urine is likely from the Escamilla catheter placement.    Drink plenty of fluids to stay hydrated.    Follow up with your Primary Care provider in 4 days for a recheck.    Return to the Emergency Department for any high fevers, persistent vomiting, or any other concerns.

## 2021-09-16 NOTE — TELEPHONE ENCOUNTER
Triage Call:    Patient is calling as since the ED visit, she was told that her bladder was very distended, she was sent home with a catheter in place.  It was going well until this afternoon.  Everytime she is urinating there is burning, later this afternoon noted that the catheter was leaking.  The urine in the bag has changed to a different color and it is more red now, not as dark as cranberry juice per patient.   There is no bleeding around the catheter,   Lower abdomen is currently a little doss than usual, but it has been that way for a few years.       Pt was advised of protocol recommendation/disposition of be seen in the next 24 hours.  Tx to scheduling and      Laquita Box RN on 9/15/2021 at 9:39 PM        COVID 19 Nurse Triage Plan/Patient Instructions    Please be aware that novel coronavirus (COVID-19) may be circulating in the community. If you develop symptoms such as fever, cough, or SOB or if you have concerns about the presence of another infection including coronavirus (COVID-19), please contact your health care provider or visit [email or web address].     Disposition/Instructions    In-Person Visit with provider recommended. Reference Visit Selection Guide.    Thank you for taking steps to prevent the spread of this virus.  o Limit your contact with others.  o Wear a simple mask to cover your cough.  o Wash your hands well and often.    Resources    [facility]: About COVID-19: [email or web address]    CDC: What to Do If You're Sick: www.cdc.gov/coronavirus/2019-ncov/about/steps-when-sick.html    CDC: Ending Home Isolation: www.cdc.gov/coronavirus/2019-ncov/hcp/disposition-in-home-patients.html     CDC: Caring for Someone: www.cdc.gov/coronavirus/2019-ncov/if-you-are-sick/care-for-someone.html     [facility]: Interim Guidance for Hospital Discharge to Home: www.health.[facility].mn.us/diseases/coronavirus/hcp/hospdischarge.pdf    [facility] clinical trials (COVID-19 research  studies): clinicalaffairs.[facility]-clinical-trials     Below are the COVID-19 hotlines at the [place] [facility] of [facility] ([facility]). Interpreters are available.   o For health questions: Call 773-653-3933 or 1-365.792.2192 (7 a.m. to 7 p.m.)  o For questions about schools and childcare: Call 614-262-7400 or 1-383.877.9151 (7 a.m. to 7 p.m.)                   Reason for Disposition    [1] Bloody or red-colored urine  AND [2] no recent prostate or bladder surgery  (Exception: brief episode and urine now clear)    Additional Information    Negative: Shock suspected (e.g., cold/pale/clammy skin, too weak to stand, low BP, rapid pulse)    Negative: Sounds like a life-threatening emergency to the triager    Negative: [1] Catheter was accidentally pulled-out AND [2] bright red continuous bleeding    Negative: SEVERE abdominal pain    Negative: Fever > 100.4 F (38.0 C)    Negative: [1] Drinking very little AND [2] dehydration suspected (e.g., no urine > 12 hours, very dry mouth, very lightheaded)    Negative: Patient sounds very sick or weak to the triager    Negative: Catheter was accidentally pulled-out    Negative: [1] Catheter is broken AND [2] is not usable    Negative: Bleeding around catheter (e.g., from penis or female urethra)    Negative: Lower abdominal pain or distention    Negative: [1] No urine in bag > 4 hours AND [2] catheter is not kinked    Negative: [1] Tea-colored or slightly red urine lasts > 24 hours AND [2] not cleared by increasing fluid intake    AND [3] no recent prostate or bladder surgery    Negative: [1] Cloudy urine lasts > 24 hours AND [2] not cleared by increasing fluid intake    Protocols used: URINARY CATHETER SYMPTOMS AND [id number]

## 2021-09-16 NOTE — ED PROVIDER NOTES
EMERGENCY DEPARTMENT ENCOUNTER      NAME: Carina Hernandez  AGE: 75 year old female  YOB: 1945  MRN: [id number]  EVALUATION DATE & TIME: 9/16/2021  1:51 PM    PCP: Alonzo Barbosa    ED PROVIDER: Sathya Griffith M.D.      Chief Complaint   Patient presents with     Urine cath issues         IMPRESSION  1. Escamilla catheter problem, initial encounter (H)    2. Hematuria, unspecified type        PLAN  - routine home Escamilla cares  - close PCP/Urology follow up  - discharge to home    ED COURSE & MEDICAL DECISION MAKING    ED Course as of Sep 16 1734   Thu Sep 16, 2021   1354 75yoF with history of HLD, osteoporosis who was seen in the ED 2 days ago with urinary retention with Escamilla placed. Presents to the ED today for evaluation of urine leaking around her Escamilla catheter as well as blood-tinged urine. No fevers, sweats, chills, nausea, vomiting. Notes her flank pain (seen for 2 days ago with unremarkable.CT) has been controlled with Tylenol. Concerned her Escamilla catheter is leaking.    Normal vitals on presentation. Exam with benign abdomen with no tenderness, no CVA tenderness, clear lungs, normal work of breathing, no peripheral edema, normal neuro exam, no back tenderness, steady unaccompanied gait. No concern for cauda equina syndrome clinically. No concern for sepsis. Will check UA for UTI while changing out Escamilla as appears to be leaking. Patient comfortable with this plan; no further questions at this time.      1514 ED RN noted that Escamilla balloon was not completely inflated; inflated balloon and had patient walk around the ED without difficulty or any urine leaking. Given this, will not change Escamilla. Suspect leakage related to incompletely-inflated balloon. UA pending at this time.      1548 UA with no UTI; suspect hematuria from Escamilla placement. Patient feeling well on recheck; ok with discharge at this time. Will continue routine home Escamilla cares. Patient able to tolerate PO and walk without  difficulty. Return precautions and need for PCP follow up discussed and understood. No further questions at the time of discharge.          2:13 PM I met with the patient for the initial interview and physical examination. Discussed plan for treatment and workup in the ED.  3:51 PM I rechecked and updated the patient on results and plan for discharge. Patient is agreeable with plan. All questions were answered.       I wore the following PPE during this patient encounter:  N95 mask, face shield w/ eye protection, gloves    MEDICATIONS GIVEN IN THE EMERGENCY DEPARTMENT  Medications - No data to display      NEW PRESCRIPTIONS STARTED AT TODAY'S ER VISIT  Discharge Medication List as of 9/16/2021  4:00 PM      CONTINUE these medications which have NOT CHANGED    Details   ASTAXANTHIN ORAL [ASTAXANTHIN ORAL] Take by mouth., Historical      docusate sodium (COLACE) 100 MG capsule Take 1 capsule (100 mg) by mouth 2 times daily as needed for constipation, Disp-20 capsule, R-0, Local Print      MAGNESIUM CARBONATE MISC [MAGNESIUM CARBONATE MISC] Use As Directed., Historical      !! UNABLE TO FIND [UNABLE TO FIND] Med Name: Eye Protection  Basics, Historical      !! UNABLE TO FIND [UNABLE TO FIND] Med Name: Yevgeniy, Historical       !! - Potential duplicate medications found. Please discuss with provider.              =================================================================      HPI  Patient information was obtained from: patient    Use of : N/A         Carina Hernandez is a 75 year old female with a pertinent history of CKD, hyperlipidemia who presents to this ED by walk in for evaluation of catheter problem.    Per chart review, patient was seen in the [facility] ED two days ago (9/14) for evaluation of left flank pain. She had mild pain earlier in the week and after lifting a heavy box the pain was worsened. On exam, patient was tender in left flank which was worse with movement. CT abdomen showed  "constipation and urinary retention. CT lumbar spine was unremarkable. Patient had a lewis catheter placed and was diuresed. She was discharged home with instruction to follow up with urology.    Patient reports that she had a catheter placed in the ED 2 days ago. She noticed some blood in the bag yesterday. When she is standing up and walking around, it feels like she needs to urinate despite the catheter being in place. When she sat on the toilet and tried to urinate, she noticed that the catheter was leaking around the insertion site. She has been wearing a pad to catch any urine that is \"leaking\". Overnight, she is not sure if the catheter is leaking but her urine bag was clear overnight. Patient is still having some left flank and rib pain. She notes that the catheter intermittently gives her a burning sensation. She has been taking acetaminophen for pain. Denies fever, vomiting, numbness, tingling, or any other complaints at this time.    REVIEW OF SYSTEMS   Review of Systems   Constitutional: Negative for fever.   HENT: Negative for congestion.    Eyes: Negative for visual disturbance.   Respiratory: Negative for shortness of breath.    Cardiovascular: Negative for chest pain.   Gastrointestinal: Negative for abdominal pain and vomiting.   Genitourinary: Positive for dysuria and hematuria (pink tinge to catheter bag). Negative for difficulty urinating.   Musculoskeletal: Negative for arthralgias and neck stiffness.   Skin: Negative for color change.   Neurological: Negative for numbness and headaches.   Psychiatric/Behavioral: Negative for confusion.   All other systems reviewed and are negative.    --------------- MEDICAL HISTORY ---------------  PAST MEDICAL HISTORY:  History reviewed. No pertinent past medical history.  Patient Active Problem List   Diagnosis     Osteoporosis     Fatigue     Memory Lapses Or Loss     Increased Appetite (Polyphagia)     Age-related osteoporosis without current pathological " fracture     Chronic kidney disease, stage 2 (mild)     Hemorrhoids, internal     History of colonoscopy     History of Lyme disease     Hyperlipidemia     Insomnia     Left wrist injury     Lyme disease     Other osteoporosis     Lung nodules in RML/RUL and ANNA on CT chest 8/2020, recommend repeat 1 year     Diarrhea, unspecified type     Pruritic disorder     Polyarthralgia       PAST SURGICAL HISTORY:  Past Surgical History:   Procedure Laterality Date     BAND HEMORRHOIDECTOMY       OTHER SURGICAL HISTORY      left wrist surgery     TUBAL LIGATION         CURRENT MEDICATIONS:    No current facility-administered medications for this encounter.    Current Outpatient Medications:      ASTAXANTHIN ORAL, [ASTAXANTHIN ORAL] Take by mouth., Disp: , Rfl:      docusate sodium (COLACE) 100 MG capsule, Take 1 capsule (100 mg) by mouth 2 times daily as needed for constipation, Disp: 20 capsule, Rfl: 0     MAGNESIUM CARBONATE MISC, [MAGNESIUM CARBONATE MISC] Use As Directed., Disp: , Rfl:      UNABLE TO FIND, [UNABLE TO FIND] Med Name: Eye Protection  Basics, Disp: , Rfl:      UNABLE TO FIND, [UNABLE TO FIND] Med Name: Yevgeniy, Disp: , Rfl:     ALLERGIES:  Allergies   Allergen Reactions     Lactase Unknown     PN: Congestion and nasal drip     Wheat [Wheat Bran] Muscle Pain (Myalgia)     PN: Fatigue     Latex Rash       FAMILY HISTORY:  Family History   Problem Relation Age of Onset     Breast Cancer Mother      Osteoporosis Mother      Hernia Mother      Hypertension Mother      Heart Disease Father      Cerebrovascular Disease Father      Breast Cancer Sister      Hemochromatosis Son      Hemochromatosis Daughter      SOCIAL HISTORY:   Social History     Socioeconomic History     Marital status: Single     Spouse name: Not on file     Number of children: Not on file     Years of education: Not on file     Highest education level: Not on file   Occupational History     Not on file   Tobacco Use     Smoking status: Never  Smoker     Smokeless tobacco: Never Used   Substance and Sexual Activity     Alcohol use: Not Currently     Drug use: Never     Sexual activity: Not on file   Other Topics Concern     Not on file   Social History Narrative    Previously from [place], then [place], and now in [place].    She has a daughter in [place], as well as another daughter and son.    Dr. Morgan 07/25/20        Social Determinants of Health     Financial Resource Strain:      Difficulty of Paying Living Expenses:    Food Insecurity:      Worried About Running Out of Food in the Last Year:      Ran Out of Food in the Last Year:    Transportation Needs:      Lack of Transportation (Medical):      Lack of Transportation (Non-Medical):    Physical Activity:      Days of Exercise per Week:      Minutes of Exercise per Session:    Stress:      Feeling of Stress :    Social Connections:      Frequency of Communication with Friends and Family:      Frequency of Social Gatherings with Friends and Family:      Attends [employer] Services:      Active Member of Clubs or Organizations:      Attends Club or Organization Meetings:      Marital Status:    Intimate Partner Violence:      Fear of Current or Ex-Partner:      Emotionally Abused:      Physically Abused:      Sexually Abused:          --------------- PHYSICAL EXAM ---------------  VITALS:  Vitals:    09/16/21 1037   BP: 136/83   Pulse: 66   Resp: 18   Temp: 98.3  F (36.8  C)   TempSrc: Oral   SpO2: 99%   Weight: 52.2 kg (115 lb)   Height: 1.524 m (5')       PHYSICAL EXAM:    General:  alert, interactive, no distress  Eyes:  conjunctivae clear, conjugate gaze   HENT:  atraumatic, nose with no rhinorrhea, oropharynx clear  Neck:  no meningismus  Cardiovascular:  HR 70 during exam, regular rhythm, no murmurs, brisk cap refill  Chest:  no chest wall tenderness  Chest/Pulmonary:  no stridor, normal phonation, normal work of breathing, clear lungs bilaterally  Abdomen: soft, nondistended,  nontender  : pink tinged urine in lewis bag. External genitalia unremarkable to visual exam.  Back/Spine:  no CVA or midline tenderness.  Musculoskeletal:  no pretibial edema, no calf tenderness. Gross ROM intact to joints of extremities with no obvious deformities.  Skin:  warm, dry, no rash  Neuro:  awake, alert, answers questions appropriately, follows commands, moves all limbs  Psych:  calm, normal affect      --------------- RESULTS ---------------  LAB:  Reviewed and interpreted by me.  Results for orders placed or performed during the hospital encounter of 09/16/21   UA with Microscopic reflex to Culture    Specimen: Urine, Catheter   Result Value Ref Range    Color Urine Colorless Colorless, Straw, Light Yellow, Yellow    Appearance Urine Clear Clear    Glucose Urine Negative Negative mg/dL    Bilirubin Urine Negative Negative    Ketones Urine Negative Negative mg/dL    Specific Gravity Urine 1.005 1.001 - 1.030    Blood Urine >1.0 mg/dL (A) Negative    pH Urine 6.0 5.0 - 7.0    Protein Albumin Urine Negative Negative mg/dL    Urobilinogen Urine <2.0 <2.0 mg/dL    Nitrite Urine Negative Negative    Leukocyte Esterase Urine 75 Reji/uL (A) Negative    Bacteria Urine Few (A) None Seen /HPF    RBC Urine 21 (H) <=2 /HPF    WBC Urine 2 <=5 /HPF         I, Jesus Alberto Madrid, am serving as a scribe to document services personally performed by Dr. Sathya Griffith based on my observation and the provider's statements to me. I, Sathya Griffith MD attest that Jesus Alberto Madrid is acting in a scribe capacity, has observed my performance of the services and has documented them in accordance with my direction.      Sathya Griffith MD  09/16/21  Emergency Medicine  [facility] EMERGENCY DEPARTMENT  [address] [address] [phone]  Dept: [phone]     Sathya Griffith MD  09/16/21 5939

## 2021-09-16 NOTE — ED NOTES
Patient has been up and walking around room, no leading noted from catheter.  Urine is running clear, slightly pink in color.

## 2021-09-16 NOTE — TELEPHONE ENCOUNTER
"Contacted [facility] to see about getting patient in today.  Staff informed writer that \"we don't do urgent visits here, and the first opening for initial patients is not until October.  They do have her referral and will be contacting her but they advised PCP or ED for urgent evaluation.    "

## 2021-09-16 NOTE — TELEPHONE ENCOUNTER
Attempted to contact patient and left message to call back clinic.  Trying to inform her that unable to get into urology today.  Would advise ED to have catheter and symptoms assessed.  Review of ED visit shows patient was to return to ED for any new or worsening symptoms.  Will send mychart encounter indicating above information.

## 2021-09-16 NOTE — TELEPHONE ENCOUNTER
"Spoke with patient and she states that since speaking with triage yesterday evening, she has been pushing fluids as she noticed her lewis bag appeared to have reddish (cranberry colored) urine. Since yesterday, she has been experiencing leaking around the area of where the catheter is inserted and she has also felt a \"burning sensation\" near where the cath is inserted. She denies at this time any pelvic or lower abdominal pain. Urology referral was placed for patient in ED. Caller checked availability for [facility] however there are no clinic openings at this time. Caller advised that it would more than likely be recommended that she follow up with urology (number given from internal list [phone]) and was advised that in the interim I would connect with triage RN on site to further advise. Procedure room's were checked however our site does not currently carry lewis catheters.   Vamsi Hancock CMA on 9/16/2021 at 8:04 AM    "

## 2021-09-20 NOTE — PROGRESS NOTES
Assessment & Plan     Bladder distention  She was noted to have significant bladder distention on September 14, when she went initially due to left flank pain.  She tells me today that she at baseline does have urge incontinence and frequency.  She tells me that she waited a very long time in the emergency department that day and thinks that her distended bladder was likely due to just holding her urine the entire time.  She does have a Escamilla in place, placed on September 14.  However she has had significant difficulties with it leaking around the Escamilla and having hematuria.  At this time she simply wants it out.  She cannot get in with [place] [facility] until middle of October.  I did speak with the on-call urologist at [facility].  Decision was made to remove her Escamilla catheter today.  We will have her repeat a bladder and kidney ultrasound on Friday, in 2 days.  If there is any sign of urinary retention, plan will be to replace the catheter until she can see them.  I did speak with the patient after she left, and he had already reached out to her.  They have an appointment scheduled next week.  - US Abdomen Limited; Future  - US Abdomen Limited    Pleural effusion  Small left pleural effusion and bibasilar atelectasis seen on the CT abdomen when she was in the emergency department.  She describing left flank pain, seems to be right on the rib.  In the past she did have a bony island there on the left posterior lateral eighth rib.  I think this could be causing the pain.  We will go ahead and repeat a CT chest that she also has some opacities on that prior CT chest that needed follow-up.  Is not have any shortness of breath or any signs of hypervolemia on exam.  - CT Chest w/o Contrast; Future  - N terminal pro BNP outpatient; Future  - N terminal pro BNP outpatient    Other abnormalities of breathing   - N terminal pro BNP outpatient; Future  - N terminal pro BNP outpatient    Opacity of lung on  imaging study  - CT Chest w/o Contrast; Future    Pain in both lower legs  We will check her electrolytes.  - Basic metabolic panel  (Ca, Cl, CO2, Creat, Gluc, K, Na, BUN); Future  - Magnesium; Future  - Basic metabolic panel  (Ca, Cl, CO2, Creat, Gluc, K, Na, BUN)  - Magnesium    Review of the result(s) of each unique test - labs/imaging in ED  Discussion of management or test interpretation with external physician/other qualified healthcare professional/appropriate source - urology on call  Ordering of each unique test  60 minutes spent on the date of the encounter doing chart review, review of test results, patient visit and documentation            Return in about 3 months (around 12/21/2021) for Follow up.    Alonzo Barbosa MD  [facility]    Luis Fernando Lim is a 75 year old who presents for the following health issues     HPI     Was seen in the ED twice over last 2 weeks. First time on 9/14 when she had severe left sided flank pain. Was diagnosed urinary retention, lewis placed. Sent home but had leakage and blood from catheter, was seen in the ED on 9/16 and found that the Lewis balloon was simply not fully inflated.  They inflated the balloon and the patient was able to walk without any difficulty or urine leaking in the emergency department.  Urine at that time did not show any signs of UTI.  She reports that as soon as she got home, she continued to have leakage around the Lewis catheter as well as hematuria.  She is urinating around the catheter.    She reports that at baseline, she gets up 2-3 times to void.  She voids with a great deal force and then has to stay still prone for several hours.  She does have urgency and a light stream at baseline.    He continues to have pain along the left flank area.  She does not have any shortness of breath or any cough.  She takes a really big breath, she can feel the pain.  She has baseline swelling in her legs for which she wears  "compression socks.    She has noticed that when she went hiking, the other day her blood pressure was 91/50.  She felt that she was just dehydrated at the time.    At nighttime, occasionally she gets pain along the back of her legs.      ED/UC Followup:    Facility: [facility]  Date of visit: 9/14/2021 & 9/16/2021  Reason for visit: 9/14/2021: acute urinary retention, 9/16/2021: Urine Cath issues  Current Status: still bleeding & still leaking. \"what they did the last time in the ER, didn't solve the problem\" \"Still feel tenderness in the area of my L kidney, ribs sensitive to touch. Leg pain at night in calves.         Review of Systems   See above      Objective    /70 (BP Location: Right arm, Patient Position: Sitting, Cuff Size: Adult Small)   Pulse 60   Ht 5' (1.524 m)   Wt 116 lb 4 oz (52.7 kg)   SpO2 98%   BMI 22.70 kg/m    Body mass index is 22.7 kg/m .  Physical Exam   GENERAL: healthy, alert and no distress  RESP: lungs clear to auscultation - no rales, rhonchi or wheezes  CV: regular rate and rhythm, normal S1 S2, no S3 or S4, no murmur, click or rub, no peripheral edema and peripheral pulses strong  ABDOMEN: soft, nontender, no hepatosplenomegaly, no masses and bowel sounds normal  MS: Tender along L CVA, posterolateral left lower ribs  : catheter draining light red urine                "

## 2021-09-21 ENCOUNTER — OFFICE VISIT (OUTPATIENT)
Dept: PEDIATRICS | Facility: CLINIC | Age: 76
End: 2021-09-21
Payer: MEDICARE

## 2021-09-21 VITALS
SYSTOLIC BLOOD PRESSURE: 122 MMHG | HEART RATE: 60 BPM | HEIGHT: 60 IN | OXYGEN SATURATION: 98 % | BODY MASS INDEX: 22.82 KG/M2 | DIASTOLIC BLOOD PRESSURE: 70 MMHG | WEIGHT: 116.25 LBS

## 2021-09-21 DIAGNOSIS — M79.661 PAIN IN BOTH LOWER LEGS: ICD-10-CM

## 2021-09-21 DIAGNOSIS — R91.8 OPACITY OF LUNG ON IMAGING STUDY: ICD-10-CM

## 2021-09-21 DIAGNOSIS — R06.89 OTHER ABNORMALITIES OF BREATHING: ICD-10-CM

## 2021-09-21 DIAGNOSIS — J90 PLEURAL EFFUSION: ICD-10-CM

## 2021-09-21 DIAGNOSIS — N32.89 BLADDER DISTENTION: Primary | ICD-10-CM

## 2021-09-21 DIAGNOSIS — M79.662 PAIN IN BOTH LOWER LEGS: ICD-10-CM

## 2021-09-21 LAB
ANION GAP SERPL CALCULATED.3IONS-SCNC: 11 MMOL/L (ref 5–18)
BUN SERPL-MCNC: 13 MG/DL (ref 8–28)
CALCIUM SERPL-MCNC: 9.7 MG/DL (ref 8.5–10.5)
CHLORIDE BLD-SCNC: 100 MMOL/L (ref 98–107)
CO2 SERPL-SCNC: 26 MMOL/L (ref 22–31)
CREAT SERPL-MCNC: 0.77 MG/DL (ref 0.6–1.1)
GFR SERPL CREATININE-BSD FRML MDRD: 75 ML/MIN/1.73M2
GLUCOSE BLD-MCNC: 93 MG/DL (ref 70–125)
MAGNESIUM SERPL-MCNC: 2 MG/DL (ref 1.8–2.6)
POTASSIUM BLD-SCNC: 4.5 MMOL/L (ref 3.5–5)
SODIUM SERPL-SCNC: 137 MMOL/L (ref 136–145)

## 2021-09-21 PROCEDURE — 99215 OFFICE O/P EST HI 40 MIN: CPT | Performed by: PEDIATRICS

## 2021-09-21 PROCEDURE — 36415 COLL VENOUS BLD VENIPUNCTURE: CPT | Performed by: PEDIATRICS

## 2021-09-21 PROCEDURE — 83880 ASSAY OF NATRIURETIC PEPTIDE: CPT | Performed by: PEDIATRICS

## 2021-09-21 PROCEDURE — 83735 ASSAY OF MAGNESIUM: CPT | Performed by: PEDIATRICS

## 2021-09-21 PROCEDURE — 80048 BASIC METABOLIC PNL TOTAL CA: CPT | Performed by: PEDIATRICS

## 2021-09-21 RX ORDER — VIT A/VIT C/VIT E/ZINC/COPPER 2148-113
1 TABLET ORAL 2 TIMES DAILY
COMMUNITY

## 2021-09-21 ASSESSMENT — MIFFLIN-ST. JEOR: SCORE: 933.81

## 2021-09-21 NOTE — PATIENT INSTRUCTIONS
Go to the lab today. We will notify you with the results once those are available.    Go to [facility] on Friday for ultrasound    I will let you know if urology has any new recommendations.     If you are not voiding or develop abdominal pain, distention, fever/chills, or worsening symptoms, go to the ED.    GENERAL INFORMATION AND HELPFUL NUMBERS:    If labs were ordered today, please go to the outpatient lab located in the same building. Once the results are back, we will notify you with results.    If imaging was ordered to be done today, please go to [facility] (located in the same building across our lobby). Once the results are back, we will notify you with results.    If imaging was ordered to be done in the future at an [facility] facility, someone will call to schedule otherwise you may also call [phone] to schedule.    If a specialty referral was placed during today's visit, someone will call to schedule unless you were instructed to call yourself. If you are having issues with this, please message me through britebill or call our clinic at [phone] (press option 2 to speak with someone from our [employer] team).    If a mammogram was ordered during today's visit, someone will call to schedule otherwise you may also call [phone] to schedule     If a heart ultrasound or stress test was ordered today, someone will call to schedule otherwise you may also call the main Cardiology clinic at [phone] to schedule.    If a bone density scan was ordered today, someone will call to schedule otherwise you may also call [phone] to schedule.    If you have any questions or concerns after our visit today, please message me through br[employer]bill or call our clinic at [phone] (press option 2 to speak with someone from our [employer] team).

## 2021-09-22 LAB — NT-PROBNP SERPL-MCNC: 188 PG/ML (ref 0–450)

## 2021-09-22 NOTE — RESULT ENCOUNTER NOTE
Jordan Lim,    Electrolytes, kidney function and magnesium all normal.    Please let me know if you any questions or concerns,  Dr. Morgan

## 2021-09-24 NOTE — RESULT ENCOUNTER NOTE
D/w patient. Bladder no longer distended. She has been voiding. No new urinary symptoms, just her usual urgency. Still having left flank pain. There is mild L hydronephrosis seen on US, but no obstruction. D/w her to keep Urology appt in 4 days on Wed with [facility] to follow up on the hydronephrosis and urinary symptoms. Does not need lewis replaced right now.  - Dr. Morgan

## 2021-10-05 ENCOUNTER — HOSPITAL ENCOUNTER (OUTPATIENT)
Dept: CT IMAGING | Facility: HOSPITAL | Age: 76
Discharge: HOME | End: 2021-10-05
Attending: PEDIATRICS | Admitting: PEDIATRICS
Payer: MEDICARE

## 2021-10-05 DIAGNOSIS — J90 PLEURAL EFFUSION: ICD-10-CM

## 2021-10-05 DIAGNOSIS — R91.8 OPACITY OF LUNG ON IMAGING STUDY: ICD-10-CM

## 2021-10-05 PROCEDURE — 71250 CT THORAX DX C-: CPT | Mod: MG

## 2021-10-08 ENCOUNTER — MYC MEDICAL ADVICE (OUTPATIENT)
Dept: PEDIATRICS | Facility: CLINIC | Age: 76
End: 2021-10-08

## 2021-10-08 NOTE — RESULT ENCOUNTER NOTE
Jordan Lim,    There is no fluid in your lungs on the CT. You have several small lung nodules which we don't need to follow up on given you don't have many risk factors for lung cancer (no history of smoking, no lung disease, no family history of lung cancer). The radiologist didn't mention your left posterior ribs. I'll message him to see if he can look at your ribs specifically to see if that's why you are having that flank pain.    Please let me know if you have any questions or concerns,  Dr. Morgan

## 2021-10-08 NOTE — RESULT ENCOUNTER NOTE
"Jordan Lim,    The radiologist took another look at your CT and was also able to compare to a prior CT. The lung nodules are actually stable, so no further follow up needed on this.    You still have that benign \"bone island\" which is a benign finding seen in some individuals along your left 8th rib on the back. Usually these don't cause any pain and are just incidental findings. If you are still having pain, perhaps we could have you go see the orthopedic doctors to see if they think it is causing your pain. Would you like to see them? Please let me know and I can refer you to [facility].  Dr. Morgan"

## 2021-10-24 ENCOUNTER — HEALTH MAINTENANCE LETTER (OUTPATIENT)
Age: 76
End: 2021-10-24

## 2021-11-08 ENCOUNTER — IMMUNIZATION (OUTPATIENT)
Dept: NURSING | Facility: CLINIC | Age: 76
End: 2021-11-08
Payer: MEDICARE

## 2021-11-08 PROCEDURE — 0004A PR COVID VAC PFIZER DIL RECON 30 MCG/0.3 ML IM: CPT

## 2021-11-08 PROCEDURE — 91300 PR COVID VAC PFIZER DIL RECON 30 MCG/0.3 ML IM: CPT

## 2022-06-05 ENCOUNTER — HEALTH MAINTENANCE LETTER (OUTPATIENT)
Age: 77
End: 2022-06-05

## 2022-10-15 ENCOUNTER — HEALTH MAINTENANCE LETTER (OUTPATIENT)
Age: 77
End: 2022-10-15

## 2023-06-11 ENCOUNTER — HEALTH MAINTENANCE LETTER (OUTPATIENT)
Age: 78
End: 2023-06-11

## 2024-08-04 ENCOUNTER — HEALTH MAINTENANCE LETTER (OUTPATIENT)
Age: 79
End: 2024-08-04